# Patient Record
Sex: FEMALE | Race: BLACK OR AFRICAN AMERICAN | NOT HISPANIC OR LATINO | Employment: STUDENT | URBAN - METROPOLITAN AREA
[De-identification: names, ages, dates, MRNs, and addresses within clinical notes are randomized per-mention and may not be internally consistent; named-entity substitution may affect disease eponyms.]

---

## 2020-04-30 ENCOUNTER — HOSPITAL ENCOUNTER (EMERGENCY)
Facility: HOSPITAL | Age: 30
Discharge: HOME | End: 2020-04-30
Attending: EMERGENCY MEDICINE
Payer: COMMERCIAL

## 2020-04-30 VITALS
DIASTOLIC BLOOD PRESSURE: 72 MMHG | RESPIRATION RATE: 18 BRPM | WEIGHT: 270 LBS | BODY MASS INDEX: 40.92 KG/M2 | HEART RATE: 83 BPM | HEIGHT: 68 IN | OXYGEN SATURATION: 94 % | TEMPERATURE: 98 F | SYSTOLIC BLOOD PRESSURE: 133 MMHG

## 2020-04-30 DIAGNOSIS — N76.0 VULVOVAGINITIS: Primary | ICD-10-CM

## 2020-04-30 LAB
B-HCG UR QL: NEGATIVE
BACTERIA GENITAL QL WET PREP: ABNORMAL
BILIRUB UR QL STRIP: NEGATIVE
C TRACH DNA SPEC QL NAA+PROBE: NOT DETECTED
CLARITY UR: ABNORMAL
CLUE CELLS VAG QL WET PREP: ABNORMAL
COLOR UR: YELLOW
CTP QC/QA: YES
FILAMENT FUNGI VAG WET PREP-#/AREA: ABNORMAL
GLUCOSE UR QL STRIP: NEGATIVE
HGB UR QL STRIP: NEGATIVE
KETONES UR QL STRIP: NEGATIVE
LEUKOCYTE ESTERASE UR QL STRIP: ABNORMAL
MICROSCOPIC COMMENT: NORMAL
N GONORRHOEA DNA SPEC QL NAA+PROBE: NOT DETECTED
NITRITE UR QL STRIP: NEGATIVE
PH UR STRIP: 6 [PH] (ref 5–8)
PROT UR QL STRIP: NEGATIVE
SP GR UR STRIP: 1.01 (ref 1–1.03)
SPECIMEN SOURCE: ABNORMAL
T VAGINALIS GENITAL QL WET PREP: ABNORMAL
URN SPEC COLLECT METH UR: ABNORMAL
UROBILINOGEN UR STRIP-ACNC: NEGATIVE EU/DL
WBC #/AREA URNS HPF: 5 /HPF (ref 0–5)
WBC #/AREA VAG WET PREP: ABNORMAL
YEAST GENITAL QL WET PREP: ABNORMAL

## 2020-04-30 PROCEDURE — 99284 EMERGENCY DEPT VISIT MOD MDM: CPT

## 2020-04-30 PROCEDURE — 81025 URINE PREGNANCY TEST: CPT | Performed by: EMERGENCY MEDICINE

## 2020-04-30 PROCEDURE — 25000003 PHARM REV CODE 250: Performed by: NURSE PRACTITIONER

## 2020-04-30 PROCEDURE — 87491 CHLMYD TRACH DNA AMP PROBE: CPT

## 2020-04-30 PROCEDURE — 81000 URINALYSIS NONAUTO W/SCOPE: CPT

## 2020-04-30 PROCEDURE — 87210 SMEAR WET MOUNT SALINE/INK: CPT

## 2020-04-30 RX ORDER — FLUCONAZOLE 50 MG/1
150 TABLET ORAL
Status: COMPLETED | OUTPATIENT
Start: 2020-04-30 | End: 2020-04-30

## 2020-04-30 RX ORDER — ASPIRIN 325 MG
1 TABLET, DELAYED RELEASE (ENTERIC COATED) ORAL NIGHTLY
Qty: 45 G | Refills: 0 | Status: SHIPPED | OUTPATIENT
Start: 2020-04-30 | End: 2020-05-07

## 2020-04-30 RX ADMIN — FLUCONAZOLE 150 MG: 50 TABLET ORAL at 04:04

## 2020-04-30 NOTE — ED PROVIDER NOTES
Encounter Date: 4/30/2020       History     Chief Complaint   Patient presents with    Groin Swelling     reports vaginal swelling since Sun with white discharge; denies itchiness and dysuria. Reports trying Boric acid with mild relief     CC: Vaginal swelling    HPI:  This is a 29-year-old female with no medical problems presenting for evaluation of vaginal swelling that began Sunday.  She also reports associated white discharge that has improved.  She has been attempting treatment using boric acid suppository.  She denies fever, chills, abdominal pain, back pain, urinary complaints.  No known allergies.        Review of patient's allergies indicates:   Allergen Reactions    No known drug allergies      Past Medical History:   Diagnosis Date    Abnormal Pap smear     ASCUS + HR HPV    Anemia     Chlamydia     treated for chlamydia about 6 months ago    Sickle cell trait     Thrombocytopenia     in pregnancy     No past surgical history on file.  Family History   Problem Relation Age of Onset    Breast cancer Neg Hx     Ovarian cancer Neg Hx     Colon cancer Maternal Grandmother      Social History     Tobacco Use    Smoking status: Never Smoker   Substance Use Topics    Alcohol use: No    Drug use: No     Review of Systems   Constitutional: Negative for chills and fever.   HENT: Negative for sore throat.    Respiratory: Negative for shortness of breath.    Cardiovascular: Negative for chest pain.   Gastrointestinal: Negative for nausea.   Genitourinary: Positive for vaginal discharge. Negative for dysuria.        Vaginal swelling   Musculoskeletal: Negative for back pain.   Skin: Negative for rash.   Neurological: Negative for weakness.   Hematological: Does not bruise/bleed easily.       Physical Exam     Initial Vitals [04/30/20 0348]   BP Pulse Resp Temp SpO2   (!) 140/84 94 18 98.3 °F (36.8 °C) 95 %      MAP       --         Physical Exam    Constitutional: She appears well-developed and  well-nourished. She is not diaphoretic. No distress.   HENT:   Head: Normocephalic and atraumatic.   Neck: Normal range of motion.   Cardiovascular: Normal rate, regular rhythm, normal heart sounds and intact distal pulses.   Pulmonary/Chest: Breath sounds normal. No respiratory distress.   Abdominal: Soft. Bowel sounds are normal. There is no tenderness.   Genitourinary: Vagina normal and uterus normal. There is no rash, tenderness, lesion or injury on the right labia. There is no rash, tenderness, lesion or injury on the left labia. Cervix exhibits no motion tenderness, no discharge and no friability. Right adnexum displays no mass, no tenderness and no fullness. Left adnexum displays no mass, no tenderness and no fullness.   Genitourinary Comments: Vulva is erythematous and edematous.  No significant discharge.  No abscess.  No CMT or friability.  No adnexal fullness or tenderness with palpation.   exam chaperoned by RN.   Musculoskeletal: Normal range of motion.   Neurological: She is alert and oriented to person, place, and time.   Skin: Skin is warm and dry.   Psychiatric: She has a normal mood and affect. Her behavior is normal.         ED Course   Procedures  Labs Reviewed   URINALYSIS, REFLEX TO URINE CULTURE - Abnormal; Notable for the following components:       Result Value    Appearance, UA Cloudy (*)     Leukocytes, UA 2+ (*)     All other components within normal limits    Narrative:     Preferred Collection Type->Urine, Clean Catch   VAGINAL SCREEN - Abnormal; Notable for the following components:    WBC - Vaginal Screen Rare (*)     All other components within normal limits   C. TRACHOMATIS/N. GONORRHOEAE BY AMP DNA   URINALYSIS MICROSCOPIC    Narrative:     Preferred Collection Type->Urine, Clean Catch   POCT URINE PREGNANCY          Imaging Results    None          Medical Decision Making:   ED Management:  This is a 29-year-old female presenting for evaluation of vaginal swelling.  Likely  Candida vulvovaginitis.  No abscess appreciated.  No findings concerning for STI at this time.  No CMT or friability to suggest acute cervicitis.  No adnexal fullness or tenderness palpation to suggest PID, TOA.  Will treat with oral Diflucan and topical clotrimazole.  Follow up with OBGYN.                                 Clinical Impression:       ICD-10-CM ICD-9-CM   1. Vulvovaginitis N76.0 616.10         Disposition:   Disposition: Discharged  Condition: Stable     ED Disposition Condition    Discharge Stable        ED Prescriptions     Medication Sig Dispense Start Date End Date Auth. Provider    clotrimazole (LOTRIMIN) 1 % Crea Place 1 suppository (1 applicator total) vaginally nightly. for 7 days 45 g 4/30/2020 5/7/2020 Christie Ernst NP        Follow-up Information     Follow up With Specialties Details Why Contact Info    Curtis Schultz Mai, MD Obstetrics and Gynecology, Obstetrics, Gynecology Schedule an appointment as soon as possible for a visit  For follow-up [address]   [facility] [address]  [phone]      [facility] Emergency Medicine Go to  If symptoms worsen [address]  [facility] [id number] [phone]                                     Christie Ernst NP  04/30/20 0456

## 2020-04-30 NOTE — DISCHARGE INSTRUCTIONS

## 2020-04-30 NOTE — ED NOTES
Pt c/o swelling to the vaginal area x 3 days, denies itching, states she used boric acid and had some relief. Pt is A & O x 3, denies SOB, fever, chills, cough and N/V/D. Skin is warm, dry and pink. OPAL x 3mm. BBS- CTA. Abd- SNT. PSM x 4 exts. Will continue to monitor closely.

## 2020-06-24 ENCOUNTER — HOSPITAL ENCOUNTER (EMERGENCY)
Facility: HOSPITAL | Age: 30
Discharge: HOME | End: 2020-06-24
Attending: EMERGENCY MEDICINE
Payer: COMMERCIAL

## 2020-06-24 VITALS
TEMPERATURE: 99 F | BODY MASS INDEX: 40.92 KG/M2 | OXYGEN SATURATION: 95 % | HEART RATE: 85 BPM | RESPIRATION RATE: 18 BRPM | SYSTOLIC BLOOD PRESSURE: 142 MMHG | DIASTOLIC BLOOD PRESSURE: 90 MMHG | HEIGHT: 68 IN | WEIGHT: 270 LBS

## 2020-06-24 DIAGNOSIS — K04.7 DENTAL ABSCESS: Primary | ICD-10-CM

## 2020-06-24 DIAGNOSIS — R22.0 LEFT FACIAL SWELLING: ICD-10-CM

## 2020-06-24 LAB
B-HCG UR QL: NEGATIVE
CTP QC/QA: YES

## 2020-06-24 PROCEDURE — 81025 URINE PREGNANCY TEST: CPT | Performed by: PHYSICIAN ASSISTANT

## 2020-06-24 PROCEDURE — 99283 EMERGENCY DEPT VISIT LOW MDM: CPT

## 2020-06-24 PROCEDURE — 25000003 PHARM REV CODE 250: Performed by: PHYSICIAN ASSISTANT

## 2020-06-24 RX ORDER — HYDROCODONE BITARTRATE AND ACETAMINOPHEN 5; 325 MG/1; MG/1
1 TABLET ORAL EVERY 6 HOURS PRN
Qty: 12 TABLET | Refills: 0 | Status: SHIPPED | OUTPATIENT
Start: 2020-06-24 | End: 2020-06-27

## 2020-06-24 RX ORDER — CLINDAMYCIN HYDROCHLORIDE 150 MG/1
300 CAPSULE ORAL
Status: COMPLETED | OUTPATIENT
Start: 2020-06-24 | End: 2020-06-24

## 2020-06-24 RX ORDER — CLINDAMYCIN HYDROCHLORIDE 150 MG/1
300 CAPSULE ORAL 4 TIMES DAILY
Qty: 80 CAPSULE | Refills: 0 | Status: SHIPPED | OUTPATIENT
Start: 2020-06-24 | End: 2020-07-04

## 2020-06-24 RX ORDER — MELOXICAM 7.5 MG/1
7.5 TABLET ORAL DAILY
Qty: 12 TABLET | Refills: 0 | Status: SHIPPED | OUTPATIENT
Start: 2020-06-24

## 2020-06-24 RX ADMIN — CLINDAMYCIN HYDROCHLORIDE 300 MG: 150 CAPSULE ORAL at 02:06

## 2020-06-24 NOTE — ED PROVIDER NOTES
Encounter Date: 6/24/2020       History     Chief Complaint   Patient presents with    Facial Swelling     Patient reports left side facial swelling  and right upper tooth pain that started on Sunday. Seen yesterday at urgent care, given rx for amoxicillin and ibuprofen. No relief of symptoms.     29-year-old female with no pertinent past medical history presents to the emergency department for 4 day history of left upper dental pain that worsened yesterday.  Started on Amoxil yesterday at an urgent care.  Has appointment with dentist Saturday morning.  Patient has also been started on gabapentin and Motrin by urgent care which is not improving or symptoms.  Denies sore throat, fever, and emesis.  Tolerating p.o..  No history of similar symptoms.        Review of patient's allergies indicates:   Allergen Reactions    No known drug allergies      Past Medical History:   Diagnosis Date    Abnormal Pap smear     ASCUS + HR HPV    Anemia     Chlamydia     treated for chlamydia about 6 months ago    Sickle cell trait     Thrombocytopenia     in pregnancy     History reviewed. No pertinent surgical history.  Family History   Problem Relation Age of Onset    Colon cancer Maternal Grandmother     Breast cancer Neg Hx     Ovarian cancer Neg Hx      Social History     Tobacco Use    Smoking status: Never Smoker   Substance Use Topics    Alcohol use: No    Drug use: No     Review of Systems   Constitutional: Negative for fever.   HENT: Positive for dental problem and facial swelling. Negative for congestion, sore throat and trouble swallowing.    Respiratory: Negative for cough and shortness of breath.    Cardiovascular: Negative for chest pain.   Gastrointestinal: Negative for abdominal pain, constipation, diarrhea, nausea and vomiting.   Genitourinary: Negative for dysuria, flank pain, frequency and urgency.   Musculoskeletal: Negative for back pain.   Skin: Negative for rash.   Neurological: Negative for  headaches.   All other systems reviewed and are negative.      Physical Exam     Initial Vitals [06/24/20 1332]   BP Pulse Resp Temp SpO2   (!) 152/99 83 18 99.3 °F (37.4 °C) 98 %      MAP       --         Physical Exam    Nursing note and vitals reviewed.  Constitutional: She appears well-developed and well-nourished. She is not diaphoretic. No distress.   HENT:   Head: Normocephalic and atraumatic.   Nose: Nose normal.   Mild swelling to the left face without erythema.  Multiple dental caries noted.  No erythema or swelling of the gum line.  Speaking in clear and complete sentences with no changes in phonation.  No trismus or drooling.  No maxillary sinus tenderness.   Eyes: Conjunctivae and EOM are normal. Right eye exhibits no discharge. Left eye exhibits no discharge.   Neck: Normal range of motion. No tracheal deviation present. No JVD present.   Cardiovascular: Normal rate, regular rhythm and normal heart sounds. Exam reveals no friction rub.    No murmur heard.  Pulmonary/Chest: Breath sounds normal. No stridor. No respiratory distress. She has no wheezes. She has no rhonchi. She has no rales. She exhibits no tenderness.   Musculoskeletal: Normal range of motion.   Neurological: She is alert and oriented to person, place, and time.   Skin: Skin is warm and dry. No rash and no abscess noted. No erythema. No pallor.         ED Course   Procedures  Labs Reviewed   POCT URINE PREGNANCY          Imaging Results    None          Medical Decision Making:   History:   Old Medical Records: I decided to obtain old medical records.  Clinical Tests:   Lab Tests: Ordered and Reviewed  ED Management:  Patient appears to have a small/developing dental abscess.  No visible/drainable abscess in the ED today.  Will add clindamycin to patient's previously prescribed Amoxil.  Patient has dental appointment Saturday; advised that she get an earlier appointment if possible.  No Deshawn's or deep space infection.  I doubt  osteomyelitis of sinus cavity.  Sent home with supportive care.  Strict return precautions discussed with patient who is agreeable to the plan                                 Clinical Impression:       ICD-10-CM ICD-9-CM   1. Dental abscess  K04.7 522.5   2. Left facial swelling  R22.0 784.2             ED Disposition Condition    Discharge Stable        ED Prescriptions     Medication Sig Dispense Start Date End Date Auth. Provider    clindamycin (CLEOCIN) 150 MG capsule Take 2 capsules (300 mg total) by mouth 4 (four) times daily. for 10 days 80 capsule 6/24/2020 7/4/2020 Chano Wilkins PA-C    meloxicam (MOBIC) 7.5 MG tablet Take 1 tablet (7.5 mg total) by mouth once daily. 12 tablet 6/24/2020  Chano Wilkins PA-C    HYDROcodone-acetaminophen (NORCO) 5-325 mg per tablet Take 1 tablet by mouth every 6 (six) hours as needed for Pain. 12 tablet 6/24/2020 6/27/2020 Chano Wilkins PA-C        Follow-up Information     Follow up With Specialties Details Why Contact Info    Ghislaine Pizarro  Schedule an appointment as soon as possible for a visit in 1 day For further evaluation of dental issues Address: [address] [place] [address] B, [place] [address]  Phone: [phone]  Appointments: [email or web address]    [facility] Surgical Oncology, Orthopedic Surgery, Genetics, Physical Medicine and Rehabilitation, Occupational Therapy, Radiology Go in 1 day as an alternative to specialist evaluation [address] [facility] [address]  [phone]      [facility] Emergency Medicine Go to  If symptoms worsen [address]  [facility] [id number] [phone]                                     Chano Wilkins PA-C  06/24/20 1260

## 2020-07-25 PROCEDURE — 99285 EMERGENCY DEPT VISIT HI MDM: CPT | Mod: 25

## 2020-07-26 ENCOUNTER — HOSPITAL ENCOUNTER (INPATIENT)
Facility: HOSPITAL | Age: 30
LOS: 3 days | Discharge: HOME | DRG: 871 | End: 2020-07-29
Attending: EMERGENCY MEDICINE | Admitting: EMERGENCY MEDICINE
Payer: COMMERCIAL

## 2020-07-26 DIAGNOSIS — R04.2 COUGH WITH HEMOPTYSIS: ICD-10-CM

## 2020-07-26 DIAGNOSIS — R05.9 COUGH: ICD-10-CM

## 2020-07-26 DIAGNOSIS — J18.9 PNEUMONIA OF RIGHT LOWER LOBE DUE TO INFECTIOUS ORGANISM: Primary | ICD-10-CM

## 2020-07-26 DIAGNOSIS — R04.2 HEMOPTYSIS: ICD-10-CM

## 2020-07-26 DIAGNOSIS — R00.0 TACHYCARDIA: ICD-10-CM

## 2020-07-26 PROBLEM — R79.89 POSITIVE D DIMER: Status: ACTIVE | Noted: 2020-07-26

## 2020-07-26 PROBLEM — A41.9 SEPSIS: Status: ACTIVE | Noted: 2020-07-26

## 2020-07-26 PROBLEM — D69.6 THROMBOCYTOPENIA: Status: ACTIVE | Noted: 2020-07-26

## 2020-07-26 LAB
ALBUMIN SERPL BCP-MCNC: 3.7 G/DL (ref 3.5–5.2)
ALP SERPL-CCNC: 67 U/L (ref 55–135)
ALT SERPL W/O P-5'-P-CCNC: 6 U/L (ref 10–44)
ANION GAP SERPL CALC-SCNC: 7 MMOL/L (ref 8–16)
APTT BLDCRRT: 35.1 SEC (ref 21–32)
AST SERPL-CCNC: 10 U/L (ref 10–40)
B-HCG UR QL: NEGATIVE
BASOPHILS # BLD AUTO: 0.02 K/UL (ref 0–0.2)
BASOPHILS NFR BLD: 0.1 % (ref 0–1.9)
BILIRUB SERPL-MCNC: 0.9 MG/DL (ref 0.1–1)
BNP SERPL-MCNC: 79 PG/ML (ref 0–99)
BUN SERPL-MCNC: 9 MG/DL (ref 6–20)
CALCIUM SERPL-MCNC: 9.1 MG/DL (ref 8.7–10.5)
CHLORIDE SERPL-SCNC: 106 MMOL/L (ref 95–110)
CK SERPL-CCNC: 30 U/L (ref 20–180)
CO2 SERPL-SCNC: 25 MMOL/L (ref 23–29)
CREAT SERPL-MCNC: 0.8 MG/DL (ref 0.5–1.4)
CRP SERPL-MCNC: 261 MG/L (ref 0–8.2)
CTP QC/QA: YES
D DIMER PPP IA.FEU-MCNC: 1.36 MG/L FEU
DIFFERENTIAL METHOD: ABNORMAL
EOSINOPHIL # BLD AUTO: 0 K/UL (ref 0–0.5)
EOSINOPHIL NFR BLD: 0.1 % (ref 0–8)
ERYTHROCYTE [DISTWIDTH] IN BLOOD BY AUTOMATED COUNT: 14.1 % (ref 11.5–14.5)
ERYTHROCYTE [SEDIMENTATION RATE] IN BLOOD BY WESTERGREN METHOD: 50 MM/HR (ref 0–20)
EST. GFR  (AFRICAN AMERICAN): >60 ML/MIN/1.73 M^2
EST. GFR  (NON AFRICAN AMERICAN): >60 ML/MIN/1.73 M^2
FERRITIN SERPL-MCNC: 178 NG/ML (ref 20–300)
FIBRINOGEN PPP-MCNC: 517 MG/DL (ref 182–366)
GLUCOSE SERPL-MCNC: 99 MG/DL (ref 70–110)
HCT VFR BLD AUTO: 39.1 % (ref 37–48.5)
HGB BLD-MCNC: 13.3 G/DL (ref 12–16)
IMM GRANULOCYTES # BLD AUTO: 0.11 K/UL (ref 0–0.04)
IMM GRANULOCYTES NFR BLD AUTO: 0.7 % (ref 0–0.5)
INR PPP: 1 (ref 0.8–1.2)
LACTATE SERPL-SCNC: 2.1 MMOL/L (ref 0.5–2.2)
LDH SERPL L TO P-CCNC: 222 U/L (ref 110–260)
LYMPHOCYTES # BLD AUTO: 1 K/UL (ref 1–4.8)
LYMPHOCYTES NFR BLD: 6.6 % (ref 18–48)
MCH RBC QN AUTO: 28.6 PG (ref 27–31)
MCHC RBC AUTO-ENTMCNC: 34 G/DL (ref 32–36)
MCV RBC AUTO: 84 FL (ref 82–98)
MONOCYTES # BLD AUTO: 0.4 K/UL (ref 0.3–1)
MONOCYTES NFR BLD: 2.3 % (ref 4–15)
NEUTROPHILS # BLD AUTO: 14 K/UL (ref 1.8–7.7)
NEUTROPHILS NFR BLD: 90.2 % (ref 38–73)
NRBC BLD-RTO: 0 /100 WBC
PLATELET # BLD AUTO: 129 K/UL (ref 150–350)
PMV BLD AUTO: 11.4 FL (ref 9.2–12.9)
POTASSIUM SERPL-SCNC: 3.2 MMOL/L (ref 3.5–5.1)
PROCALCITONIN SERPL IA-MCNC: 6.07 NG/ML
PROT SERPL-MCNC: 7 G/DL (ref 6–8.4)
PROTHROMBIN TIME: 11.3 SEC (ref 9–12.5)
RBC # BLD AUTO: 4.65 M/UL (ref 4–5.4)
SARS-COV-2 RDRP RESP QL NAA+PROBE: NEGATIVE
SODIUM SERPL-SCNC: 138 MMOL/L (ref 136–145)
TROPONIN I SERPL DL<=0.01 NG/ML-MCNC: 0.01 NG/ML (ref 0–0.03)
WBC # BLD AUTO: 15.53 K/UL (ref 3.9–12.7)

## 2020-07-26 PROCEDURE — 87186 SC STD MICRODIL/AGAR DIL: CPT | Mod: 59

## 2020-07-26 PROCEDURE — 36415 COLL VENOUS BLD VENIPUNCTURE: CPT

## 2020-07-26 PROCEDURE — 82550 ASSAY OF CK (CPK): CPT

## 2020-07-26 PROCEDURE — 83880 ASSAY OF NATRIURETIC PEPTIDE: CPT

## 2020-07-26 PROCEDURE — 84484 ASSAY OF TROPONIN QUANT: CPT

## 2020-07-26 PROCEDURE — 11000001 HC ACUTE MED/SURG PRIVATE ROOM

## 2020-07-26 PROCEDURE — 93005 ELECTROCARDIOGRAM TRACING: CPT

## 2020-07-26 PROCEDURE — 93010 EKG 12-LEAD: ICD-10-PCS | Mod: ,,, | Performed by: INTERNAL MEDICINE

## 2020-07-26 PROCEDURE — 84145 PROCALCITONIN (PCT): CPT

## 2020-07-26 PROCEDURE — 87070 CULTURE OTHR SPECIMN AEROBIC: CPT

## 2020-07-26 PROCEDURE — 27100098 HC SPACER

## 2020-07-26 PROCEDURE — 85610 PROTHROMBIN TIME: CPT

## 2020-07-26 PROCEDURE — U0003 INFECTIOUS AGENT DETECTION BY NUCLEIC ACID (DNA OR RNA); SEVERE ACUTE RESPIRATORY SYNDROME CORONAVIRUS 2 (SARS-COV-2) (CORONAVIRUS DISEASE [COVID-19]), AMPLIFIED PROBE TECHNIQUE, MAKING USE OF HIGH THROUGHPUT TECHNOLOGIES AS DESCRIBED BY CMS-2020-01-R: HCPCS

## 2020-07-26 PROCEDURE — 85384 FIBRINOGEN ACTIVITY: CPT

## 2020-07-26 PROCEDURE — 86140 C-REACTIVE PROTEIN: CPT

## 2020-07-26 PROCEDURE — 63600175 PHARM REV CODE 636 W HCPCS: Performed by: EMERGENCY MEDICINE

## 2020-07-26 PROCEDURE — U0002 COVID-19 LAB TEST NON-CDC: HCPCS

## 2020-07-26 PROCEDURE — 25000003 PHARM REV CODE 250: Performed by: NURSE PRACTITIONER

## 2020-07-26 PROCEDURE — 87040 BLOOD CULTURE FOR BACTERIA: CPT

## 2020-07-26 PROCEDURE — 25000003 PHARM REV CODE 250: Performed by: EMERGENCY MEDICINE

## 2020-07-26 PROCEDURE — 93010 ELECTROCARDIOGRAM REPORT: CPT | Mod: ,,, | Performed by: INTERNAL MEDICINE

## 2020-07-26 PROCEDURE — 87205 SMEAR GRAM STAIN: CPT

## 2020-07-26 PROCEDURE — 25500020 PHARM REV CODE 255: Performed by: HOSPITALIST

## 2020-07-26 PROCEDURE — 87185 SC STD ENZYME DETCJ PER NZM: CPT

## 2020-07-26 PROCEDURE — 63600175 PHARM REV CODE 636 W HCPCS: Performed by: HOSPITALIST

## 2020-07-26 PROCEDURE — 87077 CULTURE AEROBIC IDENTIFY: CPT

## 2020-07-26 PROCEDURE — 63700000 PHARM REV CODE 250 ALT 637 W/O HCPCS: Performed by: EMERGENCY MEDICINE

## 2020-07-26 PROCEDURE — 25000003 PHARM REV CODE 250: Performed by: HOSPITALIST

## 2020-07-26 PROCEDURE — 81025 URINE PREGNANCY TEST: CPT | Performed by: EMERGENCY MEDICINE

## 2020-07-26 PROCEDURE — 83605 ASSAY OF LACTIC ACID: CPT

## 2020-07-26 PROCEDURE — 25000242 PHARM REV CODE 250 ALT 637 W/ HCPCS: Performed by: EMERGENCY MEDICINE

## 2020-07-26 PROCEDURE — 94640 AIRWAY INHALATION TREATMENT: CPT

## 2020-07-26 PROCEDURE — 80053 COMPREHEN METABOLIC PANEL: CPT

## 2020-07-26 PROCEDURE — 94761 N-INVAS EAR/PLS OXIMETRY MLT: CPT

## 2020-07-26 PROCEDURE — 82728 ASSAY OF FERRITIN: CPT

## 2020-07-26 PROCEDURE — 85025 COMPLETE CBC W/AUTO DIFF WBC: CPT

## 2020-07-26 PROCEDURE — 85730 THROMBOPLASTIN TIME PARTIAL: CPT

## 2020-07-26 PROCEDURE — 83615 LACTATE (LD) (LDH) ENZYME: CPT

## 2020-07-26 PROCEDURE — 85379 FIBRIN DEGRADATION QUANT: CPT

## 2020-07-26 PROCEDURE — 85652 RBC SED RATE AUTOMATED: CPT

## 2020-07-26 RX ORDER — LOPERAMIDE HYDROCHLORIDE 2 MG/1
2 CAPSULE ORAL 4 TIMES DAILY PRN
Status: DISCONTINUED | OUTPATIENT
Start: 2020-07-26 | End: 2020-07-29 | Stop reason: HOSPADM

## 2020-07-26 RX ORDER — AZITHROMYCIN 250 MG/1
250 TABLET, FILM COATED ORAL DAILY
Status: DISCONTINUED | OUTPATIENT
Start: 2020-07-26 | End: 2020-07-28

## 2020-07-26 RX ORDER — POTASSIUM CHLORIDE 20 MEQ/1
40 TABLET, EXTENDED RELEASE ORAL ONCE
Status: COMPLETED | OUTPATIENT
Start: 2020-07-26 | End: 2020-07-26

## 2020-07-26 RX ORDER — BENZONATATE 100 MG/1
100 CAPSULE ORAL 3 TIMES DAILY PRN
Status: DISCONTINUED | OUTPATIENT
Start: 2020-07-26 | End: 2020-07-29 | Stop reason: HOSPADM

## 2020-07-26 RX ORDER — ALBUTEROL SULFATE 90 UG/1
2 AEROSOL, METERED RESPIRATORY (INHALATION)
Status: COMPLETED | OUTPATIENT
Start: 2020-07-26 | End: 2020-07-26

## 2020-07-26 RX ORDER — ONDANSETRON 2 MG/ML
4 INJECTION INTRAMUSCULAR; INTRAVENOUS EVERY 8 HOURS PRN
Status: DISCONTINUED | OUTPATIENT
Start: 2020-07-26 | End: 2020-07-29 | Stop reason: HOSPADM

## 2020-07-26 RX ORDER — SODIUM CHLORIDE 9 MG/ML
INJECTION, SOLUTION INTRAVENOUS CONTINUOUS
Status: DISCONTINUED | OUTPATIENT
Start: 2020-07-26 | End: 2020-07-29 | Stop reason: HOSPADM

## 2020-07-26 RX ORDER — ENOXAPARIN SODIUM 100 MG/ML
40 INJECTION SUBCUTANEOUS EVERY 24 HOURS
Status: DISCONTINUED | OUTPATIENT
Start: 2020-07-26 | End: 2020-07-29 | Stop reason: HOSPADM

## 2020-07-26 RX ORDER — IBUPROFEN 200 MG
16 TABLET ORAL
Status: DISCONTINUED | OUTPATIENT
Start: 2020-07-26 | End: 2020-07-29 | Stop reason: HOSPADM

## 2020-07-26 RX ORDER — BENZONATATE 100 MG/1
CAPSULE ORAL
Status: DISPENSED
Start: 2020-07-26 | End: 2020-07-26

## 2020-07-26 RX ORDER — SODIUM CHLORIDE 0.9 % (FLUSH) 0.9 %
10 SYRINGE (ML) INJECTION
Status: DISCONTINUED | OUTPATIENT
Start: 2020-07-26 | End: 2020-07-29 | Stop reason: HOSPADM

## 2020-07-26 RX ORDER — IBUPROFEN 200 MG
24 TABLET ORAL
Status: DISCONTINUED | OUTPATIENT
Start: 2020-07-26 | End: 2020-07-29 | Stop reason: HOSPADM

## 2020-07-26 RX ORDER — GLUCAGON 1 MG
1 KIT INJECTION
Status: DISCONTINUED | OUTPATIENT
Start: 2020-07-26 | End: 2020-07-29 | Stop reason: HOSPADM

## 2020-07-26 RX ORDER — ACETAMINOPHEN 325 MG/1
650 TABLET ORAL EVERY 6 HOURS PRN
Status: DISCONTINUED | OUTPATIENT
Start: 2020-07-26 | End: 2020-07-29 | Stop reason: HOSPADM

## 2020-07-26 RX ADMIN — POTASSIUM CHLORIDE 40 MEQ: 1500 TABLET, EXTENDED RELEASE ORAL at 08:07

## 2020-07-26 RX ADMIN — ACETAMINOPHEN 650 MG: 325 TABLET ORAL at 03:07

## 2020-07-26 RX ADMIN — LOPERAMIDE HYDROCHLORIDE 2 MG: 2 CAPSULE ORAL at 12:07

## 2020-07-26 RX ADMIN — ENOXAPARIN SODIUM 40 MG: 40 INJECTION SUBCUTANEOUS at 05:07

## 2020-07-26 RX ADMIN — BENZONATATE 100 MG: 100 CAPSULE ORAL at 12:07

## 2020-07-26 RX ADMIN — AZITHROMYCIN MONOHYDRATE 250 MG: 250 TABLET ORAL at 08:07

## 2020-07-26 RX ADMIN — SODIUM CHLORIDE: 0.9 INJECTION, SOLUTION INTRAVENOUS at 08:07

## 2020-07-26 RX ADMIN — CEFTRIAXONE 1 G: 1 INJECTION, SOLUTION INTRAVENOUS at 04:07

## 2020-07-26 RX ADMIN — SODIUM CHLORIDE 1000 ML: 0.9 INJECTION, SOLUTION INTRAVENOUS at 04:07

## 2020-07-26 RX ADMIN — ACETAMINOPHEN 650 MG: 325 TABLET ORAL at 08:07

## 2020-07-26 RX ADMIN — ALBUTEROL SULFATE 2 PUFF: 90 AEROSOL, METERED RESPIRATORY (INHALATION) at 01:07

## 2020-07-26 RX ADMIN — BENZONATATE 100 MG: 100 CAPSULE ORAL at 01:07

## 2020-07-26 RX ADMIN — IOHEXOL 75 ML: 350 INJECTION, SOLUTION INTRAVENOUS at 04:07

## 2020-07-26 NOTE — PLAN OF CARE
SW met with patient to complete discharge needs assessment. Patient was able to verbalize her help at home is mother, Zayda Schwab. SW discussed with patient the things she's responsible for to manage her health at home would be by going on her doctor appointments, taking medications as prescribed, and getting prescriptions filled. SW wrote name and phone number on white communication board. Patient prefers to attend MD appointments around 4:00 PM.    Extended Emergency Contact Information  Primary Emergency Contact: Zayda Ty  Address: [address] . . .           [place] [address] [facility]  Home Phone: [phone]  Mobile Phone: [phone]  Relation: Mother     [facility] - [place] - [address]  [address]  [place] [address]  Phone: [phone] Fax: [phone]    [facility] [address] [place] [address]  Phone: [phone] Fax: [phone]        07/26/20 1418   Discharge Assessment   Confirmed/corrected address and phone number on facesheet? Yes   Assessment information obtained from? Patient   Communicated expected length of stay with patient/caregiver no   Prior to hospitilization cognitive status: Alert/Oriented   Prior to hospitalization functional status: Independent   Current cognitive status: Alert/Oriented   Current Functional Status: Independent   Facility Arrived From: Pt drove herself to the hospital   Lives With child(emeka), dependent   Able to Return to Prior Arrangements yes   Is patient able to care for self after discharge? Yes   Who are your caregiver(s) and their phone number(s)? Zayda Ty, mother [phone]   Patient's perception of discharge disposition home or selfcare   Readmission Within the Last 30 Days no previous admission in last 30 days   Patient currently being followed by outpatient case management? No   Patient currently receives any other outside agency services? No   Equipment  Currently Used at Home none   Do you have any problems affording any of your prescribed medications?   (Pt not currently prescribed any meds.)   Is the patient taking medications as prescribed?   (Pt not currently prescribed any meds.)   Does the patient have transportation home? Yes   Transportation Anticipated family or friend will provide   Dialysis Name and Scheduled days Not Applicable.   Does the patient receive services at the Coumadin Clinic? No   Discharge Plan A Home with family   DME Needed Upon Discharge  none   Patient/Family in Agreement with Plan yes

## 2020-07-26 NOTE — SUBJECTIVE & OBJECTIVE
Past Medical History:   Diagnosis Date    Abnormal Pap smear     ASCUS + HR HPV    Anemia     Chlamydia     treated for chlamydia about 6 months ago    Sickle cell trait     Thrombocytopenia     in pregnancy       History reviewed. No pertinent surgical history.    Review of patient's allergies indicates:   Allergen Reactions    No known drug allergies        No current facility-administered medications on file prior to encounter.      Current Outpatient Medications on File Prior to Encounter   Medication Sig    meloxicam (MOBIC) 7.5 MG tablet Take 1 tablet (7.5 mg total) by mouth once daily.    prenatal vit-iron fumarate-FA 27-1 mg Tab Take 1 tablet by mouth once daily.    [DISCONTINUED] medroxyPROGESTERone (DEPO-PROVERA) 150 mg/mL Syrg Inject 1 mL (150 mg total) into the muscle every 3 (three) months.     Family History     Problem Relation (Age of Onset)    Colon cancer Maternal Grandmother        Tobacco Use    Smoking status: Never Smoker   Substance and Sexual Activity    Alcohol use: No    Drug use: No    Sexual activity: Yes     Partners: Male     Birth control/protection: None     Review of Systems   Constitutional: Positive for fever.   HENT: Positive for congestion. Negative for sore throat.    Eyes: Negative for visual disturbance.   Respiratory: Positive for cough (blood tinged sputum), chest tightness and shortness of breath. Negative for wheezing.    Cardiovascular: Negative for chest pain, palpitations and leg swelling.   Gastrointestinal: Negative for abdominal pain, blood in stool, diarrhea, nausea and vomiting.   Genitourinary: Negative for dysuria, flank pain and hematuria.   Musculoskeletal: Negative for joint swelling and myalgias.   Skin: Negative for rash.   Neurological: Negative for headaches.   Psychiatric/Behavioral: Negative for confusion.     Objective:     Vital Signs (Most Recent):  Temp: 99.4 °F (37.4 °C) (07/26/20 0615)  Pulse: (!) 116 (07/26/20 0615)  Resp: 20  (07/26/20 0615)  BP: 130/70 (07/26/20 0615)  SpO2: 98 % (07/26/20 0615) Vital Signs (24h Range):  Temp:  [99 °F (37.2 °C)-100 °F (37.8 °C)] 99.4 °F (37.4 °C)  Pulse:  [113-133] 116  Resp:  [20-33] 20  SpO2:  [89 %-98 %] 98 %  BP: (121-156)/() 130/70     Weight: 121.6 kg (268 lb 1.3 oz)  Body mass index is 39.59 kg/m².    Physical Exam  Constitutional:       Appearance: She is ill-appearing. She is not diaphoretic.   HENT:      Head: Normocephalic and atraumatic.      Nose: Congestion present.   Eyes:      Pupils: Pupils are equal, round, and reactive to light.   Neck:      Musculoskeletal: Normal range of motion and neck supple.   Cardiovascular:      Rate and Rhythm: Tachycardia present.      Pulses: Normal pulses.      Heart sounds: Normal heart sounds.   Pulmonary:      Breath sounds: Rales (coarse R>L) present.      Comments: Tachypnea  Chest:      Chest wall: Tenderness (right sided) present.   Abdominal:      General: Bowel sounds are normal.      Palpations: Abdomen is soft.      Tenderness: There is no abdominal tenderness. There is no right CVA tenderness, left CVA tenderness or guarding.   Musculoskeletal: Normal range of motion.      Right lower leg: No edema.      Left lower leg: No edema.   Skin:     General: Skin is warm and dry.      Capillary Refill: Capillary refill takes less than 2 seconds.   Neurological:      Mental Status: She is alert and oriented to person, place, and time.   Psychiatric:         Mood and Affect: Mood normal.         Thought Content: Thought content normal.           CRANIAL NERVES     CN III, IV, VI   Pupils are equal, round, and reactive to light.       Significant Labs:   CBC:   Recent Labs   Lab 07/26/20  0128   WBC 15.53*   HGB 13.3   HCT 39.1   *     CMP:   Recent Labs   Lab 07/26/20 0128      K 3.2*      CO2 25   GLU 99   BUN 9   CREATININE 0.8   CALCIUM 9.1   PROT 7.0   ALBUMIN 3.7   BILITOT 0.9   ALKPHOS 67   AST 10   ALT 6*   ANIONGAP 7*    EGFRNONAA >60     Cardiac Markers: No results for input(s): CKMB, MYOGLOBIN, BNP, TROPISTAT in the last 48 hours.  Coagulation:   Recent Labs   Lab 07/26/20  0128   INR 1.0   APTT 35.1*     Lactic Acid:   Recent Labs   Lab 07/26/20  0408   LACTATE 2.1     POCT Glucose: No results for input(s): POCTGLUCOSE in the last 48 hours.  Respiratory Culture: No results for input(s): GSRESP, RESPIRATORYC in the last 48 hours.  Troponin:   Recent Labs   Lab 07/26/20  0128   TROPONINI 0.007     Urine Studies: No results for input(s): COLORU, APPEARANCEUA, PHUR, SPECGRAV, PROTEINUA, GLUCUA, KETONESU, BILIRUBINUA, OCCULTUA, NITRITE, UROBILINOGEN, LEUKOCYTESUR, RBCUA, WBCUA, BACTERIA, SQUAMEPITHEL, HYALINECASTS in the last 48 hours.    Invalid input(s): WRIGHTSUR  All pertinent labs within the past 24 hours have been reviewed.    Significant Imaging: I have reviewed and interpreted all pertinent imaging results/findings within the past 24 hours.

## 2020-07-26 NOTE — ED PROVIDER NOTES
Encounter Date: 7/25/2020       History     Chief Complaint   Patient presents with    Hemoptysis     dx'd w/ PNA today, coughing alot, had fever of 103 today     29-year-old female who denies past medical history presents with hemoptysis.  She states she woke up this morning feeling unwell, she had a fever.  She had a cough.  She went to an urgent care where she was diagnosed with pneumonia, she was prescribed azithromycin.  She states after returning home she took her first dose of azithromycin and started having cough with blood in the sputum.  This prompted her to present to the emergency department.  She states she did have right-sided chest pain earlier today but after taking Tylenol and Motrin this improved.  She last took Tylenol several hours ago.  She denies previous history of hemoptysis, denies previous history of incarceration or residing in a homeless shelter or previous diagnosis of tuberculosis.  Patient also reports she was tested for COVID earlier today and states the result was negative, however, she has been getting emails from work about multiple people who have tested positive recently.        Review of patient's allergies indicates:   Allergen Reactions    No known drug allergies      Past Medical History:   Diagnosis Date    Abnormal Pap smear     ASCUS + HR HPV    Anemia     Chlamydia     treated for chlamydia about 6 months ago    Sickle cell trait     Thrombocytopenia     in pregnancy     History reviewed. No pertinent surgical history.  Family History   Problem Relation Age of Onset    Colon cancer Maternal Grandmother     Breast cancer Neg Hx     Ovarian cancer Neg Hx      Social History     Tobacco Use    Smoking status: Never Smoker   Substance Use Topics    Alcohol use: No    Drug use: No     Review of Systems   Constitutional: Positive for chills and fever.   HENT: Negative for congestion and sore throat.    Eyes: Negative for visual disturbance.   Respiratory: Positive  for cough. Negative for shortness of breath.    Cardiovascular: Positive for chest pain.   Gastrointestinal: Negative for abdominal pain, nausea and vomiting.   Genitourinary: Negative for dysuria and vaginal discharge.   Skin: Negative for rash.   Neurological: Negative for headaches.   Psychiatric/Behavioral: Negative for decreased concentration.       Physical Exam     Initial Vitals [07/26/20 0003]   BP Pulse Resp Temp SpO2   (!) 142/67 (!) 120 20 99 °F (37.2 °C) (!) 93 %      MAP       --         Physical Exam    Nursing note and vitals reviewed.  Constitutional: She appears well-developed and well-nourished. She is not diaphoretic. No distress.   HENT:   Mouth/Throat: Oropharynx is clear and moist.   Eyes: Pupils are equal, round, and reactive to light.   Neck: Neck supple.   Cardiovascular: Regular rhythm. Tachycardia present.    Pulmonary/Chest: No respiratory distress.   Diminished breath sounds in the right base.  No respiratory distress.  Patient has an emesis bag with pink colored sputum.   Abdominal: Soft. There is no abdominal tenderness.   Musculoskeletal: No edema.   Neurological: She is alert and oriented to person, place, and time.   Skin: Skin is warm and dry.   Psychiatric: She has a normal mood and affect.         ED Course   Procedures  Labs Reviewed   CBC W/ AUTO DIFFERENTIAL - Abnormal; Notable for the following components:       Result Value    WBC 15.53 (*)     Platelets 129 (*)     Immature Granulocytes 0.7 (*)     Gran # (ANC) 14.0 (*)     Immature Grans (Abs) 0.11 (*)     Gran% 90.2 (*)     Lymph% 6.6 (*)     Mono% 2.3 (*)     All other components within normal limits   COMPREHENSIVE METABOLIC PANEL - Abnormal; Notable for the following components:    Potassium 3.2 (*)     ALT 6 (*)     Anion Gap 7 (*)     All other components within normal limits   APTT - Abnormal; Notable for the following components:    aPTT 35.1 (*)     All other components within normal limits   D DIMER,  QUANTITATIVE - Abnormal; Notable for the following components:    D-Dimer 1.36 (*)     All other components within normal limits   CULTURE, BLOOD   CULTURE, BLOOD   TROPONIN I   PROTIME-INR   SARS-COV-2 RNA AMPLIFICATION, QUAL   LACTIC ACID, PLASMA   POCT URINE PREGNANCY     EKG Readings: (Independently Interpreted)   Sinus tachycardia, rate 113 beats per minute, normal AK interval,  milliseconds.  No STEMI.       Imaging Results          X-Ray Chest AP Portable (Final result)  Result time 07/26/20 01:44:54    Final result by Triny Jensen MD (07/26/20 01:44:54)                 Impression:      Right lower lobe pneumonia.      Electronically signed by: Triny Jensen  Date:    07/26/2020  Time:    01:44             Narrative:    EXAMINATION:  AP PORTABLE CHEST    CLINICAL HISTORY:  hemoptysis;    TECHNIQUE:  AP portable chest radiograph was submitted.    COMPARISON:  08/26/2012    FINDINGS:  AP portable chest radiograph demonstrates a cardiac silhouette within normal limits.  There is a right lower lobe opacity.  There is no pneumothorax or significant pleural effusion.                                 Medical Decision Making:   Initial Assessment:   29-year-old female presenting with hemoptysis.  Diagnosed with pneumonia earlier today.  On exam, she does have slightly diminished breath sounds in the right base.  Her O2 sats 93% on room air.  She is tachycardic.  She has an emesis bag with pink tinged sputum.  My overall impression is patient is pneumonia, however, PE is a consideration.  I plan to assess this patient with basic labs, chest x-ray, coags, D-dimer.                   ED Course as of Jul 26 0437   Sun Jul 26, 2020   0316 Discussed with lab- there is a problem with the instrument running the D-dimer so this result has been delayed.     [LH]   0351 Patient's D-dimer is elevated.  I have ordered a CTA of the chest.  Given her hypoxia, pneumonia, I plan to admit this patient to Hospital  Medicine.  I gave her dose of Rocephin here, shared he took 500 mg of azithromycin prior to coming to the emergency department.  Will obtain blood cultures and lactic acid and give IV fluids.  Her COVID screen is negative.  Case was discussed with Blaze Post for admission to hospital medicine. CTA pending at time of admission.     [LH]      ED Course User Index  [LH] Dipti Henriquez MD                Clinical Impression:       ICD-10-CM ICD-9-CM   1. Pneumonia of right lower lobe due to infectious organism  J18.1 486   2. Hemoptysis  R04.2 786.30   3. Cough  R05 786.2   4. Cough with hemoptysis  R04.2 786.39   5. Tachycardia  R00.0 785.0             ED Disposition Condition    Admit                This dictation has been generated using M-Modal Fluency Direct dictation; some phonetic errors may occur.              Dipti Henriquez MD  07/26/20 0437

## 2020-07-26 NOTE — NURSING
Report received from Antonette PIEDRA. [Patient on telebox #[id number].]Patient remains free from falls and injury. No distress noted. VSS. Questions encouraged and answered. Patient verbalized understanding. Bed locked and in low position; safety maintained. Call light in reach. White board updated, and explained. No complaint of pain, n/v, diarrhea, or SOB.  Will continue to monitor, will continue with plan of care.

## 2020-07-26 NOTE — ASSESSMENT & PLAN NOTE
Meet sepsis criteria with fever, tachycardia, WBC, and source of infection as pneumonia. Covid negative. IVF bolus 1L given in ED.  BP stable. Lactate normal    - Continue IVF  - On Rocephin and Azithromycin  - BC and sputum gram stain and culture pending

## 2020-07-26 NOTE — ED TRIAGE NOTES
Pt present to the ED reports when to urgent care malcolm rayo'd w/ PNA today, coughing a lot with blood around 1400, had fever of 103 today with relief from medication. Pt also have some left side chest pain. Denies SOB.  Pt reports taking tylenol and ibuprofen last dose around 2100.

## 2020-07-26 NOTE — ASSESSMENT & PLAN NOTE
Has fever 102, hemoptysis, hypoxic. Chest xray showed right lower lobe pneumonia. Leukocytosis WBC 15. Oxygen sats drop to 89% on room air. Right-sided chest pain worse with cough and deep breathing. Per patient, COVID negative in an urgent care, and again negative in ED. Was diagnosed with pneumonia and given Azithromycin. Oxygen sats now 98% on 3L NC    - Add Rocephin and continue Azithromycin  - Check Procalcitonin, LD, Ferritin, CRP, Fibrinogen.  - Consider COVID routine screening pending labs  - Continuous pulse ox  - Oxygen supplemental as needed  - BC and sputum culture pending

## 2020-07-26 NOTE — NURSING
Call received from lab. Patient's blood cultures have grown 1/4 bottles, gram positive cocci and chains resembling strep. MD notified via secure chat. MD confirmed receipt of notice. Will continue to monitor. Will continue with plan of care.

## 2020-07-26 NOTE — NURSING
Patient transferred to COVID unit for Rule Out COVID per MD order. Routine COVID screen completed. Respiratory cultures collected. Patient appears tearful and verbalizes fear and uncertainty. Updated on plan of care to the best of this RNs knowledge. Reassurance provided. Education on disease processes provided. Patient verbalized understanding. Will continue to monitor. Will continue with plan of care.

## 2020-07-26 NOTE — ASSESSMENT & PLAN NOTE
Chronic with baseline from 119-139. Plt today 129. Elevated aPTT 35.1.     - Check fibrinogen level.

## 2020-07-26 NOTE — H&P
[facility] Hospital Medicine  History & Physical    Patient Name: Lubna Ty  MRN: [id number]  Admission Date: 7/26/2020  Attending Physician: Nicole Mojica MD   Primary Care Provider: Primary Doctor No         Patient information was obtained from patient and ER records.     Subjective:     Principal Problem:Sepsis    Chief Complaint:   Chief Complaint   Patient presents with    Hemoptysis     dx'd w/ PNA today, coughing alot, had fever of 103 today        HPI: This is 29 year old with sickle cell trait and thrombocytopenia who presents to the hospital with recent diagnosed with pneumonia and hemoptysis. Patient reports she has been having bad allergy symptoms since last week with bad congestion and productive cough. She states she woke up yesterday morning feeling unwell and had a fever of 102. She went to an urgent care where she was diagnosed with pneumonia, she was prescribed azithromycin.  She states after returning home she took her first dose of azithromycin and started having cough with blood in the sputum. She states she also has right-sided sharp chest pain with radiating to her upper right back, which worse with coughing and deep breathing. Chest pain improved with Tylenol and Motrin at home. She denies any abdominal pain, headache, loss of taste, N/V/D, dysuria. She denies previous history of hemoptysis, denies previous history of incarceration or residing in a homeless shelter or previous diagnosis of tuberculosis.  Patient also reports she was tested for COVID earlier today and states the result was negative, however, she has been getting emails from work about multiple people who have tested positive recently. She has Mirena IUD.    In ED, COVID negative. Chest xray with right lower lobe pneumonia.  Leukocytosis 15. Elevated D-Dimer. Elevated aPTT. Lactate negative. CTA chest without no PE.    Patient is admitted for sepsis/pneumonia        Past Medical History:   Diagnosis  Date    Abnormal Pap smear     ASCUS + HR HPV    Anemia     Chlamydia     treated for chlamydia about 6 months ago    Sickle cell trait     Thrombocytopenia     in pregnancy       History reviewed. No pertinent surgical history.    Review of patient's allergies indicates:   Allergen Reactions    No known drug allergies        No current facility-administered medications on file prior to encounter.      Current Outpatient Medications on File Prior to Encounter   Medication Sig    meloxicam (MOBIC) 7.5 MG tablet Take 1 tablet (7.5 mg total) by mouth once daily.    prenatal vit-iron fumarate-FA 27-1 mg Tab Take 1 tablet by mouth once daily.    [DISCONTINUED] medroxyPROGESTERone (DEPO-PROVERA) 150 mg/mL Syrg Inject 1 mL (150 mg total) into the muscle every 3 (three) months.     Family History     Problem Relation (Age of Onset)    Colon cancer Maternal Grandmother        Tobacco Use    Smoking status: Never Smoker   Substance and Sexual Activity    Alcohol use: No    Drug use: No    Sexual activity: Yes     Partners: Male     Birth control/protection: None     Review of Systems   Constitutional: Positive for fever.   HENT: Positive for congestion. Negative for sore throat.    Eyes: Negative for visual disturbance.   Respiratory: Positive for cough (blood tinged sputum), chest tightness and shortness of breath. Negative for wheezing.    Cardiovascular: Negative for chest pain, palpitations and leg swelling.   Gastrointestinal: Negative for abdominal pain, blood in stool, diarrhea, nausea and vomiting.   Genitourinary: Negative for dysuria, flank pain and hematuria.   Musculoskeletal: Negative for joint swelling and myalgias.   Skin: Negative for rash.   Neurological: Negative for headaches.   Psychiatric/Behavioral: Negative for confusion.     Objective:     Vital Signs (Most Recent):  Temp: 99.4 °F (37.4 °C) (07/26/20 0615)  Pulse: (!) 116 (07/26/20 0615)  Resp: 20 (07/26/20 0615)  BP: 130/70 (07/26/20  0615)  SpO2: 98 % (07/26/20 0615) Vital Signs (24h Range):  Temp:  [99 °F (37.2 °C)-100 °F (37.8 °C)] 99.4 °F (37.4 °C)  Pulse:  [113-133] 116  Resp:  [20-33] 20  SpO2:  [89 %-98 %] 98 %  BP: (121-156)/() 130/70     Weight: 121.6 kg (268 lb 1.3 oz)  Body mass index is 39.59 kg/m².    Physical Exam  Constitutional:       Appearance: She is ill-appearing. She is not diaphoretic.   HENT:      Head: Normocephalic and atraumatic.      Nose: Congestion present.   Eyes:      Pupils: Pupils are equal, round, and reactive to light.   Neck:      Musculoskeletal: Normal range of motion and neck supple.   Cardiovascular:      Rate and Rhythm: Tachycardia present.      Pulses: Normal pulses.      Heart sounds: Normal heart sounds.   Pulmonary:      Breath sounds: Rales (coarse R>L) present.      Comments: Tachypnea  Chest:      Chest wall: Tenderness (right sided) present.   Abdominal:      General: Bowel sounds are normal.      Palpations: Abdomen is soft.      Tenderness: There is no abdominal tenderness. There is no right CVA tenderness, left CVA tenderness or guarding.   Musculoskeletal: Normal range of motion.      Right lower leg: No edema.      Left lower leg: No edema.   Skin:     General: Skin is warm and dry.      Capillary Refill: Capillary refill takes less than 2 seconds.   Neurological:      Mental Status: She is alert and oriented to person, place, and time.   Psychiatric:         Mood and Affect: Mood normal.         Thought Content: Thought content normal.           CRANIAL NERVES     CN III, IV, VI   Pupils are equal, round, and reactive to light.       Significant Labs:   CBC:   Recent Labs   Lab 07/26/20  0128   WBC 15.53*   HGB 13.3   HCT 39.1   *     CMP:   Recent Labs   Lab 07/26/20  0128      K 3.2*      CO2 25   GLU 99   BUN 9   CREATININE 0.8   CALCIUM 9.1   PROT 7.0   ALBUMIN 3.7   BILITOT 0.9   ALKPHOS 67   AST 10   ALT 6*   ANIONGAP 7*   EGFRNONAA >60     Cardiac Markers: No  results for input(s): CKMB, MYOGLOBIN, BNP, TROPISTAT in the last 48 hours.  Coagulation:   Recent Labs   Lab 07/26/20  0128   INR 1.0   APTT 35.1*     Lactic Acid:   Recent Labs   Lab 07/26/20  0408   LACTATE 2.1     POCT Glucose: No results for input(s): POCTGLUCOSE in the last 48 hours.  Respiratory Culture: No results for input(s): GSRESP, RESPIRATORYC in the last 48 hours.  Troponin:   Recent Labs   Lab 07/26/20  0128   TROPONINI 0.007     Urine Studies: No results for input(s): COLORU, APPEARANCEUA, PHUR, SPECGRAV, PROTEINUA, GLUCUA, KETONESU, BILIRUBINUA, OCCULTUA, NITRITE, UROBILINOGEN, LEUKOCYTESUR, RBCUA, WBCUA, BACTERIA, SQUAMEPITHEL, HYALINECASTS in the last 48 hours.    Invalid input(s): WRIGHTSUR  All pertinent labs within the past 24 hours have been reviewed.    Significant Imaging: I have reviewed and interpreted all pertinent imaging results/findings within the past 24 hours.    Assessment/Plan:     * Sepsis  Meet sepsis criteria with fever, tachycardia, WBC, and source of infection as pneumonia. Covid negative. IVF bolus 1L given in ED.  BP stable. Lactate normal    - Continue IVF  - On Rocephin and Azithromycin  - BC and sputum gram stain and culture pending    Pneumonia of right lower lobe due to infectious organism  Has fever 102, hemoptysis, hypoxic. Chest xray showed right lower lobe pneumonia. Leukocytosis WBC 15. Oxygen sats drop to 89% on room air. Right-sided chest pain worse with cough and deep breathing. Per patient, COVID negative in an urgent care, and again negative in ED. Was diagnosed with pneumonia and given Azithromycin. Oxygen sats now 98% on 3L NC    - Add Rocephin and continue Azithromycin  - Check Procalcitonin, LD, Ferritin, CRP, Fibrinogen.  - Consider COVID routine screening pending labs  - Continuous pulse ox  - Oxygen supplemental as needed  - BC and sputum culture pending      Thrombocytopenia  Chronic with baseline from 119-139. Plt today 129. Elevated aPTT 35.1.     -  Check fibrinogen level.      Positive D dimer  D-Dimer elevated 1.36 with hypoxic. CTA chest no PE.        VTE Risk Mitigation (From admission, onward)         Ordered     Place ITA hose  Until discontinued      07/26/20 0751     IP VTE HIGH RISK PATIENT  Once      07/26/20 0609     Place sequential compression device  Until discontinued      07/26/20 0609                   Blaze Post NP  Department of Hospital Medicine   [facility]

## 2020-07-26 NOTE — HPI
This is 29 year old with sickle cell trait and thrombocytopenia who presents to the hospital with recent diagnosed with pneumonia and hemoptysis. Patient reports she has been having bad allergy symptoms since last week with bad congestion and productive cough. She states she woke up yesterday morning feeling unwell and had a fever of 102. She went to an urgent care where she was diagnosed with pneumonia, she was prescribed azithromycin.  She states after returning home she took her first dose of azithromycin and started having cough with blood in the sputum. She states she also has right-sided sharp chest pain with radiating to her upper right back, which worse with coughing and deep breathing. Chest pain improved with Tylenol and Motrin at home. She denies any abdominal pain, headache, loss of taste, N/V/D, dysuria. She denies previous history of hemoptysis, denies previous history of incarceration or residing in a homeless shelter or previous diagnosis of tuberculosis.  Patient also reports she was tested for COVID earlier today and states the result was negative, however, she has been getting emails from work about multiple people who have tested positive recently. She has Mirena IUD.    In ED, COVID negative. Chest xray with right lower lobe pneumonia.  Leukocytosis 15. Elevated D-Dimer. Elevated aPTT. Lactate negative. CTA chest without no PE.    Patient is admitted for sepsis/pneumonia

## 2020-07-26 NOTE — NURSING
Pt arrived from ED via stretcher with bolus hanging and ambulated to bed with no assist. Tele box was applied and VS taken. Pt appears in NAD at this time, will continue with current plan of care.

## 2020-07-27 PROBLEM — J96.01 ACUTE RESPIRATORY FAILURE WITH HYPOXIA: Status: ACTIVE | Noted: 2020-07-27

## 2020-07-27 LAB
ALBUMIN SERPL BCP-MCNC: 2.9 G/DL (ref 3.5–5.2)
ALP SERPL-CCNC: 61 U/L (ref 55–135)
ALT SERPL W/O P-5'-P-CCNC: 7 U/L (ref 10–44)
AMPHET+METHAMPHET UR QL: NEGATIVE
ANION GAP SERPL CALC-SCNC: 6 MMOL/L (ref 8–16)
AST SERPL-CCNC: 8 U/L (ref 10–40)
BARBITURATES UR QL SCN>200 NG/ML: NEGATIVE
BASOPHILS # BLD AUTO: 0.03 K/UL (ref 0–0.2)
BASOPHILS NFR BLD: 0.2 % (ref 0–1.9)
BENZODIAZ UR QL SCN>200 NG/ML: NEGATIVE
BILIRUB SERPL-MCNC: 0.5 MG/DL (ref 0.1–1)
BUN SERPL-MCNC: 6 MG/DL (ref 6–20)
BZE UR QL SCN: NEGATIVE
CALCIUM SERPL-MCNC: 8.4 MG/DL (ref 8.7–10.5)
CANNABINOIDS UR QL SCN: NEGATIVE
CHLORIDE SERPL-SCNC: 108 MMOL/L (ref 95–110)
CO2 SERPL-SCNC: 24 MMOL/L (ref 23–29)
CREAT SERPL-MCNC: 0.7 MG/DL (ref 0.5–1.4)
CREAT UR-MCNC: 59.9 MG/DL (ref 15–325)
DIFFERENTIAL METHOD: ABNORMAL
EOSINOPHIL # BLD AUTO: 0.2 K/UL (ref 0–0.5)
EOSINOPHIL NFR BLD: 1.1 % (ref 0–8)
ERYTHROCYTE [DISTWIDTH] IN BLOOD BY AUTOMATED COUNT: 14.3 % (ref 11.5–14.5)
EST. GFR  (AFRICAN AMERICAN): >60 ML/MIN/1.73 M^2
EST. GFR  (NON AFRICAN AMERICAN): >60 ML/MIN/1.73 M^2
GLUCOSE SERPL-MCNC: 94 MG/DL (ref 70–110)
HCT VFR BLD AUTO: 33.1 % (ref 37–48.5)
HGB BLD-MCNC: 11 G/DL (ref 12–16)
IMM GRANULOCYTES # BLD AUTO: 0.11 K/UL (ref 0–0.04)
IMM GRANULOCYTES NFR BLD AUTO: 0.6 % (ref 0–0.5)
LYMPHOCYTES # BLD AUTO: 2.8 K/UL (ref 1–4.8)
LYMPHOCYTES NFR BLD: 14.3 % (ref 18–48)
MAGNESIUM SERPL-MCNC: 1.8 MG/DL (ref 1.6–2.6)
MCH RBC QN AUTO: 28.9 PG (ref 27–31)
MCHC RBC AUTO-ENTMCNC: 33.2 G/DL (ref 32–36)
MCV RBC AUTO: 87 FL (ref 82–98)
METHADONE UR QL SCN>300 NG/ML: NEGATIVE
MONOCYTES # BLD AUTO: 0.7 K/UL (ref 0.3–1)
MONOCYTES NFR BLD: 3.6 % (ref 4–15)
NEUTROPHILS # BLD AUTO: 15.5 K/UL (ref 1.8–7.7)
NEUTROPHILS NFR BLD: 80.2 % (ref 38–73)
NRBC BLD-RTO: 0 /100 WBC
OPIATES UR QL SCN: NEGATIVE
PCP UR QL SCN>25 NG/ML: NEGATIVE
PHOSPHATE SERPL-MCNC: 2.5 MG/DL (ref 2.7–4.5)
PLATELET # BLD AUTO: 142 K/UL (ref 150–350)
PMV BLD AUTO: 11.7 FL (ref 9.2–12.9)
POTASSIUM SERPL-SCNC: 3.4 MMOL/L (ref 3.5–5.1)
PROT SERPL-MCNC: 6.3 G/DL (ref 6–8.4)
RBC # BLD AUTO: 3.81 M/UL (ref 4–5.4)
SARS-COV-2 RNA RESP QL NAA+PROBE: NOT DETECTED
SODIUM SERPL-SCNC: 138 MMOL/L (ref 136–145)
TOXICOLOGY INFORMATION: NORMAL
WBC # BLD AUTO: 19.33 K/UL (ref 3.9–12.7)

## 2020-07-27 PROCEDURE — 85025 COMPLETE CBC W/AUTO DIFF WBC: CPT

## 2020-07-27 PROCEDURE — 80053 COMPREHEN METABOLIC PANEL: CPT

## 2020-07-27 PROCEDURE — 25000242 PHARM REV CODE 250 ALT 637 W/ HCPCS: Performed by: HOSPITALIST

## 2020-07-27 PROCEDURE — 83735 ASSAY OF MAGNESIUM: CPT

## 2020-07-27 PROCEDURE — 36415 COLL VENOUS BLD VENIPUNCTURE: CPT

## 2020-07-27 PROCEDURE — 63700000 PHARM REV CODE 250 ALT 637 W/O HCPCS: Performed by: EMERGENCY MEDICINE

## 2020-07-27 PROCEDURE — 63600175 PHARM REV CODE 636 W HCPCS: Performed by: EMERGENCY MEDICINE

## 2020-07-27 PROCEDURE — 25000003 PHARM REV CODE 250: Performed by: EMERGENCY MEDICINE

## 2020-07-27 PROCEDURE — 25000003 PHARM REV CODE 250: Performed by: NURSE PRACTITIONER

## 2020-07-27 PROCEDURE — 80307 DRUG TEST PRSMV CHEM ANLYZR: CPT

## 2020-07-27 PROCEDURE — 84100 ASSAY OF PHOSPHORUS: CPT

## 2020-07-27 PROCEDURE — 11000001 HC ACUTE MED/SURG PRIVATE ROOM

## 2020-07-27 PROCEDURE — 63600175 PHARM REV CODE 636 W HCPCS: Performed by: HOSPITALIST

## 2020-07-27 RX ADMIN — BENZONATATE 100 MG: 100 CAPSULE ORAL at 08:07

## 2020-07-27 RX ADMIN — CEFTRIAXONE 1 G: 1 INJECTION, SOLUTION INTRAVENOUS at 05:07

## 2020-07-27 RX ADMIN — AZITHROMYCIN MONOHYDRATE 250 MG: 250 TABLET ORAL at 10:07

## 2020-07-27 RX ADMIN — ACETAMINOPHEN 650 MG: 325 TABLET ORAL at 05:07

## 2020-07-27 RX ADMIN — ENOXAPARIN SODIUM 40 MG: 40 INJECTION SUBCUTANEOUS at 04:07

## 2020-07-27 RX ADMIN — DEXAMETHASONE 6 MG: 2 TABLET ORAL at 10:07

## 2020-07-27 NOTE — ASSESSMENT & PLAN NOTE
Has fever 102, hemoptysis, hypoxic. Chest xray showed right lower lobe pneumonia. Leukocytosis WBC 15. Oxygen sats drop to 89% on room air. Right-sided chest pain worse with cough and deep breathing. Per patient, COVID negative in an urgent care, and again negative in ED. Was diagnosed with pneumonia and given Azithromycin. Oxygen sats now 98% on 3L NC    - Add Rocephin to Azithromycin coverage  - BC and sputum culture pending, COVID pending  - supplemental O2 to maintain sats > 92    No risk factors for MRSA or Pseudomonas

## 2020-07-27 NOTE — PROGRESS NOTES
[facility] Hospital Medicine  Progress Note    Patient Name: Lubna Ty  MRN: [id number]  Patient Class: IP- Inpatient   Admission Date: 7/26/2020  Length of Stay: 1 days  Attending Physician: Elie Maldonado MD  Primary Care Provider: Primary Doctor No        Subjective:     Principal Problem:Sepsis        HPI:  This is 29 year old with sickle cell trait and thrombocytopenia who presents to the hospital with recent diagnosed with pneumonia and hemoptysis. Patient reports she has been having bad allergy symptoms since last week with bad congestion and productive cough. She states she woke up yesterday morning feeling unwell and had a fever of 102. She went to an urgent care where she was diagnosed with pneumonia, she was prescribed azithromycin.  She states after returning home she took her first dose of azithromycin and started having cough with blood in the sputum. She states she also has right-sided sharp chest pain with radiating to her upper right back, which worse with coughing and deep breathing. Chest pain improved with Tylenol and Motrin at home. She denies any abdominal pain, headache, loss of taste, N/V/D, dysuria. She denies previous history of hemoptysis, denies previous history of incarceration or residing in a homeless shelter or previous diagnosis of tuberculosis.  Patient also reports she was tested for COVID earlier today and states the result was negative, however, she has been getting emails from work about multiple people who have tested positive recently. She has Mirena IUD.    In ED, COVID negative. Chest xray with right lower lobe pneumonia.  Leukocytosis 15. Elevated D-Dimer. Elevated aPTT. Lactate negative. CTA chest without no PE.    Patient is admitted for sepsis/pneumonia        Overview/Hospital Course:  No notes on file    Interval History: Breathing has improved, still with some cough and right sided pleuritic chest pain. Has h/o contact with COVID+  coworkers    Review of Systems   Constitutional: Positive for appetite change and fever.   Respiratory: Positive for cough and shortness of breath.    Cardiovascular: Positive for chest pain.   Gastrointestinal: Negative for abdominal pain.   Genitourinary: Negative for dysuria.   Neurological: Negative for light-headedness.     Objective:     Vital Signs (Most Recent):  Temp: 98.2 °F (36.8 °C) (07/27/20 1048)  Pulse: 83 (07/27/20 1048)  Resp: 17 (07/27/20 1048)  BP: 139/64 (07/27/20 1048)  SpO2: 95 % (07/27/20 1048) Vital Signs (24h Range):  Temp:  [98.2 °F (36.8 °C)-99.3 °F (37.4 °C)] 98.2 °F (36.8 °C)  Pulse:  [] 83  Resp:  [17-20] 17  SpO2:  [91 %-98 %] 95 %  BP: (115-139)/(61-73) 139/64     Weight: 121.6 kg (268 lb 1.3 oz)  Body mass index is 39.59 kg/m².    Intake/Output Summary (Last 24 hours) at 7/27/2020 1231  Last data filed at 7/27/2020 0830  Gross per 24 hour   Intake 480 ml   Output --   Net 480 ml      Physical Exam  Vitals signs and nursing note reviewed.   Constitutional:       General: She is not in acute distress.     Appearance: She is well-developed.   Eyes:      Conjunctiva/sclera: Conjunctivae normal.   Neck:      Musculoskeletal: Neck supple.      Vascular: No JVD.   Cardiovascular:      Rate and Rhythm: Normal rate and regular rhythm.      Heart sounds: Normal heart sounds.   Pulmonary:      Effort: Pulmonary effort is normal.      Breath sounds: Rhonchi and rales present. No wheezing.   Abdominal:      General: There is no distension.      Palpations: Abdomen is soft.      Tenderness: There is no abdominal tenderness. There is no rebound.   Musculoskeletal:         General: No deformity.   Skin:     General: Skin is warm and dry.      Findings: No rash.   Neurological:      Mental Status: She is alert and oriented to person, place, and time.         Significant Labs: All pertinent labs within the past 24 hours have been reviewed.    Significant Imaging: I have reviewed and interpreted  all pertinent imaging results/findings within the past 24 hours.      Assessment/Plan:      * Sepsis  As above, monitor sepsis physiology    Acute respiratory failure with hypoxia  Improved with O2, due to multilobar pneumonia, monitor and wean O2 as able      Positive D dimer  D-Dimer elevated 1.36 with hypoxic. CTA chest no PE.      Thrombocytopenia  Chronic with baseline from 119-139. Stable, monitor      Pneumonia of right lower lobe due to infectious organism  Has fever 102, hemoptysis, hypoxic. Chest xray showed right lower lobe pneumonia. Leukocytosis WBC 15. Oxygen sats drop to 89% on room air. Right-sided chest pain worse with cough and deep breathing. Per patient, COVID negative in an urgent care, and again negative in ED. Was diagnosed with pneumonia and given Azithromycin. Oxygen sats now 98% on 3L NC    - Add Rocephin to Azithromycin coverage  - BC and sputum culture pending, COVID pending  - supplemental O2 to maintain sats > 92    No risk factors for MRSA or Pseudomonas        VTE Risk Mitigation (From admission, onward)         Ordered     enoxaparin injection 40 mg  Every 24 hours      07/26/20 1622     Place ITA hose  Until discontinued      07/26/20 0751     IP VTE HIGH RISK PATIENT  Once      07/26/20 0609     Place sequential compression device  Until discontinued      07/26/20 0609                      Elie Maldonado MD  Department of Hospital Medicine   [facility]

## 2020-07-27 NOTE — PLAN OF CARE
Aox4. Able to repsoition self independently. Remains free from falls. Denies pain. C/o headache. Tylenol given. 1L O2 continued per NC, O2 sats above 93%. No distress noted.  Problem: Adult Inpatient Plan of Care  Goal: Plan of Care Review  Outcome: Ongoing, Progressing  Goal: Patient-Specific Goal (Individualization)  Outcome: Ongoing, Progressing  Goal: Absence of Hospital-Acquired Illness or Injury  Outcome: Ongoing, Progressing  Goal: Optimal Comfort and Wellbeing  Outcome: Ongoing, Progressing  Goal: Readiness for Transition of Care  Outcome: Ongoing, Progressing  Goal: Rounds/Family Conference  Outcome: Ongoing, Progressing     Problem: Respiratory Compromise (Sepsis/Septic Shock)  Goal: Effective Oxygenation and Ventilation  Outcome: Ongoing, Progressing

## 2020-07-27 NOTE — SUBJECTIVE & OBJECTIVE
Interval History: Breathing has improved, still with some cough and right sided pleuritic chest pain. Has h/o contact with COVID+ coworkers    Review of Systems   Constitutional: Positive for appetite change and fever.   Respiratory: Positive for cough and shortness of breath.    Cardiovascular: Positive for chest pain.   Gastrointestinal: Negative for abdominal pain.   Genitourinary: Negative for dysuria.   Neurological: Negative for light-headedness.     Objective:     Vital Signs (Most Recent):  Temp: 98.2 °F (36.8 °C) (07/27/20 1048)  Pulse: 83 (07/27/20 1048)  Resp: 17 (07/27/20 1048)  BP: 139/64 (07/27/20 1048)  SpO2: 95 % (07/27/20 1048) Vital Signs (24h Range):  Temp:  [98.2 °F (36.8 °C)-99.3 °F (37.4 °C)] 98.2 °F (36.8 °C)  Pulse:  [] 83  Resp:  [17-20] 17  SpO2:  [91 %-98 %] 95 %  BP: (115-139)/(61-73) 139/64     Weight: 121.6 kg (268 lb 1.3 oz)  Body mass index is 39.59 kg/m².    Intake/Output Summary (Last 24 hours) at 7/27/2020 1231  Last data filed at 7/27/2020 0830  Gross per 24 hour   Intake 480 ml   Output --   Net 480 ml      Physical Exam  Vitals signs and nursing note reviewed.   Constitutional:       General: She is not in acute distress.     Appearance: She is well-developed.   Eyes:      Conjunctiva/sclera: Conjunctivae normal.   Neck:      Musculoskeletal: Neck supple.      Vascular: No JVD.   Cardiovascular:      Rate and Rhythm: Normal rate and regular rhythm.      Heart sounds: Normal heart sounds.   Pulmonary:      Effort: Pulmonary effort is normal.      Breath sounds: Rhonchi and rales present. No wheezing.   Abdominal:      General: There is no distension.      Palpations: Abdomen is soft.      Tenderness: There is no abdominal tenderness. There is no rebound.   Musculoskeletal:         General: No deformity.   Skin:     General: Skin is warm and dry.      Findings: No rash.   Neurological:      Mental Status: She is alert and oriented to person, place, and time.          Significant Labs: All pertinent labs within the past 24 hours have been reviewed.    Significant Imaging: I have reviewed and interpreted all pertinent imaging results/findings within the past 24 hours.

## 2020-07-28 PROBLEM — E66.9 OBESITY: Status: ACTIVE | Noted: 2020-07-28

## 2020-07-28 LAB
ALBUMIN SERPL BCP-MCNC: 3.2 G/DL (ref 3.5–5.2)
ALP SERPL-CCNC: 92 U/L (ref 55–135)
ALT SERPL W/O P-5'-P-CCNC: 11 U/L (ref 10–44)
ANION GAP SERPL CALC-SCNC: 7 MMOL/L (ref 8–16)
AST SERPL-CCNC: 12 U/L (ref 10–40)
BACTERIA BLD CULT: ABNORMAL
BASOPHILS # BLD AUTO: 0.02 K/UL (ref 0–0.2)
BASOPHILS NFR BLD: 0.1 % (ref 0–1.9)
BILIRUB SERPL-MCNC: 0.5 MG/DL (ref 0.1–1)
BILIRUB UR QL STRIP: NEGATIVE
BUN SERPL-MCNC: 8 MG/DL (ref 6–20)
CALCIUM SERPL-MCNC: 9.4 MG/DL (ref 8.7–10.5)
CHLORIDE SERPL-SCNC: 106 MMOL/L (ref 95–110)
CLARITY UR: CLEAR
CO2 SERPL-SCNC: 25 MMOL/L (ref 23–29)
COLOR UR: ABNORMAL
CREAT SERPL-MCNC: 0.7 MG/DL (ref 0.5–1.4)
DIFFERENTIAL METHOD: ABNORMAL
EOSINOPHIL # BLD AUTO: 0 K/UL (ref 0–0.5)
EOSINOPHIL NFR BLD: 0 % (ref 0–8)
ERYTHROCYTE [DISTWIDTH] IN BLOOD BY AUTOMATED COUNT: 13.9 % (ref 11.5–14.5)
EST. GFR  (AFRICAN AMERICAN): >60 ML/MIN/1.73 M^2
EST. GFR  (NON AFRICAN AMERICAN): >60 ML/MIN/1.73 M^2
GLUCOSE SERPL-MCNC: 108 MG/DL (ref 70–110)
GLUCOSE UR QL STRIP: NEGATIVE
HCT VFR BLD AUTO: 35.1 % (ref 37–48.5)
HGB BLD-MCNC: 11.6 G/DL (ref 12–16)
HGB UR QL STRIP: ABNORMAL
HIV1+2 IGG SERPL QL IA.RAPID: NORMAL
IMM GRANULOCYTES # BLD AUTO: 0.08 K/UL (ref 0–0.04)
IMM GRANULOCYTES NFR BLD AUTO: 0.5 % (ref 0–0.5)
KETONES UR QL STRIP: NEGATIVE
LEUKOCYTE ESTERASE UR QL STRIP: ABNORMAL
LYMPHOCYTES # BLD AUTO: 1.8 K/UL (ref 1–4.8)
LYMPHOCYTES NFR BLD: 9.9 % (ref 18–48)
MAGNESIUM SERPL-MCNC: 2.1 MG/DL (ref 1.6–2.6)
MCH RBC QN AUTO: 28.1 PG (ref 27–31)
MCHC RBC AUTO-ENTMCNC: 33 G/DL (ref 32–36)
MCV RBC AUTO: 85 FL (ref 82–98)
MICROSCOPIC COMMENT: NORMAL
MONOCYTES # BLD AUTO: 0.4 K/UL (ref 0.3–1)
MONOCYTES NFR BLD: 2.5 % (ref 4–15)
NEUTROPHILS # BLD AUTO: 15.4 K/UL (ref 1.8–7.7)
NEUTROPHILS NFR BLD: 87 % (ref 38–73)
NITRITE UR QL STRIP: NEGATIVE
NRBC BLD-RTO: 0 /100 WBC
PH UR STRIP: 8 [PH] (ref 5–8)
PHOSPHATE SERPL-MCNC: 3.5 MG/DL (ref 2.7–4.5)
PLATELET # BLD AUTO: 188 K/UL (ref 150–350)
PMV BLD AUTO: 11.5 FL (ref 9.2–12.9)
POTASSIUM SERPL-SCNC: 4 MMOL/L (ref 3.5–5.1)
PROT SERPL-MCNC: 6.8 G/DL (ref 6–8.4)
PROT UR QL STRIP: NEGATIVE
RBC # BLD AUTO: 4.13 M/UL (ref 4–5.4)
RBC #/AREA URNS HPF: 0 /HPF (ref 0–4)
SODIUM SERPL-SCNC: 138 MMOL/L (ref 136–145)
SP GR UR STRIP: 1 (ref 1–1.03)
URN SPEC COLLECT METH UR: ABNORMAL
UROBILINOGEN UR STRIP-ACNC: NEGATIVE EU/DL
WBC # BLD AUTO: 17.65 K/UL (ref 3.9–12.7)
WBC #/AREA URNS HPF: 0 /HPF (ref 0–5)

## 2020-07-28 PROCEDURE — 63700000 PHARM REV CODE 250 ALT 637 W/O HCPCS: Performed by: EMERGENCY MEDICINE

## 2020-07-28 PROCEDURE — 83735 ASSAY OF MAGNESIUM: CPT

## 2020-07-28 PROCEDURE — 25000242 PHARM REV CODE 250 ALT 637 W/ HCPCS: Performed by: HOSPITALIST

## 2020-07-28 PROCEDURE — 81000 URINALYSIS NONAUTO W/SCOPE: CPT

## 2020-07-28 PROCEDURE — 36415 COLL VENOUS BLD VENIPUNCTURE: CPT

## 2020-07-28 PROCEDURE — 25000003 PHARM REV CODE 250: Performed by: NURSE PRACTITIONER

## 2020-07-28 PROCEDURE — 11000001 HC ACUTE MED/SURG PRIVATE ROOM

## 2020-07-28 PROCEDURE — 25000003 PHARM REV CODE 250: Performed by: EMERGENCY MEDICINE

## 2020-07-28 PROCEDURE — 63600175 PHARM REV CODE 636 W HCPCS: Performed by: HOSPITALIST

## 2020-07-28 PROCEDURE — 84100 ASSAY OF PHOSPHORUS: CPT

## 2020-07-28 PROCEDURE — 86703 HIV-1/HIV-2 1 RESULT ANTBDY: CPT

## 2020-07-28 PROCEDURE — 80053 COMPREHEN METABOLIC PANEL: CPT

## 2020-07-28 PROCEDURE — 94761 N-INVAS EAR/PLS OXIMETRY MLT: CPT

## 2020-07-28 PROCEDURE — 85025 COMPLETE CBC W/AUTO DIFF WBC: CPT

## 2020-07-28 PROCEDURE — 87040 BLOOD CULTURE FOR BACTERIA: CPT | Mod: 59

## 2020-07-28 PROCEDURE — 63600175 PHARM REV CODE 636 W HCPCS: Performed by: INTERNAL MEDICINE

## 2020-07-28 RX ORDER — GUAIFENESIN 100 MG/5ML
200 SOLUTION ORAL EVERY 4 HOURS PRN
Status: DISCONTINUED | OUTPATIENT
Start: 2020-07-28 | End: 2020-07-29 | Stop reason: HOSPADM

## 2020-07-28 RX ADMIN — ENOXAPARIN SODIUM 40 MG: 40 INJECTION SUBCUTANEOUS at 05:07

## 2020-07-28 RX ADMIN — BENZONATATE 100 MG: 100 CAPSULE ORAL at 05:07

## 2020-07-28 RX ADMIN — DEXAMETHASONE 6 MG: 2 TABLET ORAL at 08:07

## 2020-07-28 RX ADMIN — BENZONATATE 100 MG: 100 CAPSULE ORAL at 02:07

## 2020-07-28 RX ADMIN — AZITHROMYCIN MONOHYDRATE 250 MG: 250 TABLET ORAL at 08:07

## 2020-07-28 RX ADMIN — SODIUM CHLORIDE: 0.9 INJECTION, SOLUTION INTRAVENOUS at 02:07

## 2020-07-28 RX ADMIN — CEFTRIAXONE 2 G: 2 INJECTION, SOLUTION INTRAVENOUS at 04:07

## 2020-07-28 NOTE — HOSPITAL COURSE
Patient presented with cough to the ED and was found to have pneumonia. Covid negative.  Patient grew out Strep Pneumonia on blood cultures from 7/26.  Repeat blood cultures were sent on 7/28.   Patient had an uncomplicated clinical course. On the day of discharge, she was requesting to go home. She looked great. No 02 requirements. Repeat blood cultures were NG.  She will be sent home on 2 weeks of Clindamycin. Activity as tolerated. Diet- regular. Follow up with a PCP in one week. Elevated WBC count likely from steroids.

## 2020-07-28 NOTE — PROGRESS NOTES
[facility] Hospital Medicine  Progress Note    Patient Name: Lubna Ty  MRN: [id number]  Patient Class: IP- Inpatient   Admission Date: 7/26/2020  Length of Stay: 2 days  Attending Physician: Ady Loyola MD  Primary Care Provider: Primary Doctor No        Subjective:     Principal Problem:Sepsis        HPI:  This is 29 year old with sickle cell trait and thrombocytopenia who presents to the hospital with recent diagnosed with pneumonia and hemoptysis. Patient reports she has been having bad allergy symptoms since last week with bad congestion and productive cough. She states she woke up yesterday morning feeling unwell and had a fever of 102. She went to an urgent care where she was diagnosed with pneumonia, she was prescribed azithromycin.  She states after returning home she took her first dose of azithromycin and started having cough with blood in the sputum. She states she also has right-sided sharp chest pain with radiating to her upper right back, which worse with coughing and deep breathing. Chest pain improved with Tylenol and Motrin at home. She denies any abdominal pain, headache, loss of taste, N/V/D, dysuria. She denies previous history of hemoptysis, denies previous history of incarceration or residing in a homeless shelter or previous diagnosis of tuberculosis.  Patient also reports she was tested for COVID earlier today and states the result was negative, however, she has been getting emails from work about multiple people who have tested positive recently. She has Mirena IUD.    In ED, COVID negative. Chest xray with right lower lobe pneumonia.  Leukocytosis 15. Elevated D-Dimer. Elevated aPTT. Lactate negative. CTA chest without no PE.    Patient is admitted for sepsis/pneumonia        Overview/Hospital Course:  Patient presented with cough to the ED and was found to have pneumonia. Covid negative.  Patient grew out Strep Pneumonia on blood cultures from 7/26.  Repeat  blood cultures were sent on 7/28.       Interval History: states she feels better. No SOB   Review of Systems   Constitutional: Negative for activity change.   HENT: Negative for congestion.    Respiratory: Positive for cough. Negative for chest tightness and shortness of breath.    Cardiovascular: Negative for chest pain.   Gastrointestinal: Negative for abdominal pain.   Genitourinary: Negative for difficulty urinating and dyspareunia.   Musculoskeletal: Negative for arthralgias.   Skin: Negative for color change.   Psychiatric/Behavioral: Negative for agitation and behavioral problems.     Objective:     Vital Signs (Most Recent):  Temp: 98.2 °F (36.8 °C) (07/28/20 0803)  Pulse: 82 (07/28/20 0803)  Resp: 17 (07/28/20 0803)  BP: 128/66 (07/28/20 0803)  SpO2: (!) 93 % (07/28/20 0803) Vital Signs (24h Range):  Temp:  [97.7 °F (36.5 °C)-98.5 °F (36.9 °C)] 98.2 °F (36.8 °C)  Pulse:  [77-91] 82  Resp:  [16-20] 17  SpO2:  [92 %-95 %] 93 %  BP: (128-139)/(63-70) 128/66     Weight: 121.6 kg (268 lb 1.3 oz)  Body mass index is 39.59 kg/m².    Intake/Output Summary (Last 24 hours) at 7/28/2020 0851  Last data filed at 7/27/2020 2200  Gross per 24 hour   Intake 600 ml   Output --   Net 600 ml      Physical Exam  Vitals signs and nursing note reviewed.   Constitutional:       General: She is not in acute distress.     Appearance: Normal appearance. She is obese. She is not ill-appearing, toxic-appearing or diaphoretic.   Pulmonary:      Effort: No respiratory distress.   Neurological:      Mental Status: She is alert and oriented to person, place, and time.   Psychiatric:         Mood and Affect: Mood normal.         Behavior: Behavior normal.         Significant Labs:   BMP:   Recent Labs   Lab 07/28/20 0412         K 4.0      CO2 25   BUN 8   CREATININE 0.7   CALCIUM 9.4   MG 2.1     CBC:   Recent Labs   Lab 07/27/20  0624 07/28/20  0412   WBC 19.33* 17.65*   HGB 11.0* 11.6*   HCT 33.1* 35.1*   *  188       Significant Imaging:       Assessment/Plan:      * Sepsis  As above, monitor sepsis physiology    Sepsis has resolved.    Obesity  Body mass index is 39.59 kg/m².  Weight loss as out patient.       Acute respiratory failure with hypoxia  Improved with O2, due to multilobar pneumonia, monitor and wean O2 as able      Positive D dimer  D-Dimer elevated 1.36 with hypoxic. CTA chest no PE.      Thrombocytopenia  Chronic with baseline from 119-139. Stable, monitor      Pneumonia of right lower lobe due to infectious organism  Has fever 102, hemoptysis, hypoxic. Chest xray showed right lower lobe pneumonia. Leukocytosis WBC 15. Oxygen sats drop to 89% on room air. Right-sided chest pain worse with cough and deep breathing. Per patient, COVID negative in an urgent care, and again negative in ED. Was diagnosed with pneumonia and given Azithromycin. Oxygen sats now 98% on 3L NC    - Add Rocephin to Azithromycin coverage  - BC and sputum culture pending, COVID pending  - supplemental O2 to maintain sats > 92    No risk factors for MRSA or Pseudomonas    Strep pneumo on blood cultures. Will d/c Azithromycin .   Check an HIV         Dysuria- doubt UTI as she has been on Ceftriaxone- but will send for a UA.     VTE Risk Mitigation (From admission, onward)         Ordered     enoxaparin injection 40 mg  Every 24 hours      07/26/20 1622     Place ITA hose  Until discontinued      07/26/20 0751     IP VTE HIGH RISK PATIENT  Once      07/26/20 0609     Place sequential compression device  Until discontinued      07/26/20 0609              Repeat blood cultures from today pending. Home possibly tomorrow with 2 weeks of Abx.         Ady Zepeda MD  Department of Hospital Medicine   [facility]

## 2020-07-28 NOTE — SUBJECTIVE & OBJECTIVE
Interval History: states she feels better. No SOB   Review of Systems   Constitutional: Negative for activity change.   HENT: Negative for congestion.    Respiratory: Positive for cough. Negative for chest tightness and shortness of breath.    Cardiovascular: Negative for chest pain.   Gastrointestinal: Negative for abdominal pain.   Genitourinary: Negative for difficulty urinating and dyspareunia.   Musculoskeletal: Negative for arthralgias.   Skin: Negative for color change.   Psychiatric/Behavioral: Negative for agitation and behavioral problems.     Objective:     Vital Signs (Most Recent):  Temp: 98.2 °F (36.8 °C) (07/28/20 0803)  Pulse: 82 (07/28/20 0803)  Resp: 17 (07/28/20 0803)  BP: 128/66 (07/28/20 0803)  SpO2: (!) 93 % (07/28/20 0803) Vital Signs (24h Range):  Temp:  [97.7 °F (36.5 °C)-98.5 °F (36.9 °C)] 98.2 °F (36.8 °C)  Pulse:  [77-91] 82  Resp:  [16-20] 17  SpO2:  [92 %-95 %] 93 %  BP: (128-139)/(63-70) 128/66     Weight: 121.6 kg (268 lb 1.3 oz)  Body mass index is 39.59 kg/m².    Intake/Output Summary (Last 24 hours) at 7/28/2020 0851  Last data filed at 7/27/2020 2200  Gross per 24 hour   Intake 600 ml   Output --   Net 600 ml      Physical Exam  Vitals signs and nursing note reviewed.   Constitutional:       General: She is not in acute distress.     Appearance: Normal appearance. She is obese. She is not ill-appearing, toxic-appearing or diaphoretic.   Pulmonary:      Effort: No respiratory distress.   Neurological:      Mental Status: She is alert and oriented to person, place, and time.   Psychiatric:         Mood and Affect: Mood normal.         Behavior: Behavior normal.         Significant Labs:   BMP:   Recent Labs   Lab 07/28/20 0412         K 4.0      CO2 25   BUN 8   CREATININE 0.7   CALCIUM 9.4   MG 2.1     CBC:   Recent Labs   Lab 07/27/20  0624 07/28/20 0412   WBC 19.33* 17.65*   HGB 11.0* 11.6*   HCT 33.1* 35.1*   * 188       Significant Imaging:

## 2020-07-28 NOTE — ASSESSMENT & PLAN NOTE
Has fever 102, hemoptysis, hypoxic. Chest xray showed right lower lobe pneumonia. Leukocytosis WBC 15. Oxygen sats drop to 89% on room air. Right-sided chest pain worse with cough and deep breathing. Per patient, COVID negative in an urgent care, and again negative in ED. Was diagnosed with pneumonia and given Azithromycin. Oxygen sats now 98% on 3L NC    - Add Rocephin to Azithromycin coverage  - BC and sputum culture pending, COVID pending  - supplemental O2 to maintain sats > 92    No risk factors for MRSA or Pseudomonas    Strep pneumo on blood cultures. Will d/c Azithromycin .   Check an HIV

## 2020-07-28 NOTE — PLAN OF CARE
Aox4. Remains free from falls. Denies pain and SOB this shift. On RA c O2 sats 94% and above. IV abx given per orders. No distressnoted this shift. VSS.   Problem: Adult Inpatient Plan of Care  Goal: Plan of Care Review  Outcome: Ongoing, Progressing  Goal: Patient-Specific Goal (Individualization)  Outcome: Ongoing, Progressing  Goal: Absence of Hospital-Acquired Illness or Injury  Outcome: Ongoing, Progressing  Goal: Optimal Comfort and Wellbeing  Outcome: Ongoing, Progressing  Goal: Readiness for Transition of Care  Outcome: Ongoing, Progressing     Problem: Adjustment to Illness (Sepsis/Septic Shock)  Goal: Optimal Coping  Outcome: Ongoing, Progressing     Problem: Bleeding (Sepsis/Septic Shock)  Goal: Absence of Bleeding  Outcome: Ongoing, Progressing     Problem: Hemodynamic Instability (Sepsis/Septic Shock)  Goal: Effective Tissue Perfusion  Outcome: Ongoing, Progressing     Problem: Infection (Sepsis/Septic Shock)  Goal: Absence of Infection Signs/Symptoms  Outcome: Ongoing, Progressing     Problem: Nutrition Impaired (Sepsis/Septic Shock)  Goal: Optimal Nutrition Intake  Outcome: Ongoing, Progressing     Problem: Respiratory Compromise (Sepsis/Septic Shock)  Goal: Effective Oxygenation and Ventilation  Outcome: Ongoing, Progressing

## 2020-07-29 VITALS
HEART RATE: 69 BPM | OXYGEN SATURATION: 94 % | RESPIRATION RATE: 18 BRPM | WEIGHT: 268.06 LBS | HEIGHT: 69 IN | BODY MASS INDEX: 39.7 KG/M2 | TEMPERATURE: 98 F | DIASTOLIC BLOOD PRESSURE: 88 MMHG | SYSTOLIC BLOOD PRESSURE: 139 MMHG

## 2020-07-29 PROBLEM — J96.01 ACUTE RESPIRATORY FAILURE WITH HYPOXIA: Status: RESOLVED | Noted: 2020-07-27 | Resolved: 2020-07-29

## 2020-07-29 PROBLEM — A41.9 SEPSIS: Status: RESOLVED | Noted: 2020-07-26 | Resolved: 2020-07-29

## 2020-07-29 PROBLEM — R79.89 POSITIVE D DIMER: Status: RESOLVED | Noted: 2020-07-26 | Resolved: 2020-07-29

## 2020-07-29 LAB
ALBUMIN SERPL BCP-MCNC: 3.2 G/DL (ref 3.5–5.2)
ALP SERPL-CCNC: 77 U/L (ref 55–135)
ALT SERPL W/O P-5'-P-CCNC: 19 U/L (ref 10–44)
ANION GAP SERPL CALC-SCNC: 8 MMOL/L (ref 8–16)
AST SERPL-CCNC: 14 U/L (ref 10–40)
BASOPHILS # BLD AUTO: 0.04 K/UL (ref 0–0.2)
BASOPHILS NFR BLD: 0.2 % (ref 0–1.9)
BILIRUB SERPL-MCNC: 0.3 MG/DL (ref 0.1–1)
BUN SERPL-MCNC: 10 MG/DL (ref 6–20)
CALCIUM SERPL-MCNC: 8.9 MG/DL (ref 8.7–10.5)
CHLORIDE SERPL-SCNC: 107 MMOL/L (ref 95–110)
CO2 SERPL-SCNC: 24 MMOL/L (ref 23–29)
CREAT SERPL-MCNC: 0.8 MG/DL (ref 0.5–1.4)
DIFFERENTIAL METHOD: ABNORMAL
EOSINOPHIL # BLD AUTO: 0 K/UL (ref 0–0.5)
EOSINOPHIL NFR BLD: 0.1 % (ref 0–8)
ERYTHROCYTE [DISTWIDTH] IN BLOOD BY AUTOMATED COUNT: 14.1 % (ref 11.5–14.5)
EST. GFR  (AFRICAN AMERICAN): >60 ML/MIN/1.73 M^2
EST. GFR  (NON AFRICAN AMERICAN): >60 ML/MIN/1.73 M^2
GLUCOSE SERPL-MCNC: 69 MG/DL (ref 70–110)
HCT VFR BLD AUTO: 34.2 % (ref 37–48.5)
HGB BLD-MCNC: 11.5 G/DL (ref 12–16)
IMM GRANULOCYTES # BLD AUTO: 0.2 K/UL (ref 0–0.04)
IMM GRANULOCYTES NFR BLD AUTO: 1.1 % (ref 0–0.5)
LYMPHOCYTES # BLD AUTO: 4.1 K/UL (ref 1–4.8)
LYMPHOCYTES NFR BLD: 23 % (ref 18–48)
MAGNESIUM SERPL-MCNC: 1.8 MG/DL (ref 1.6–2.6)
MCH RBC QN AUTO: 28.3 PG (ref 27–31)
MCHC RBC AUTO-ENTMCNC: 33.6 G/DL (ref 32–36)
MCV RBC AUTO: 84 FL (ref 82–98)
MONOCYTES # BLD AUTO: 0.9 K/UL (ref 0.3–1)
MONOCYTES NFR BLD: 4.9 % (ref 4–15)
NEUTROPHILS # BLD AUTO: 12.5 K/UL (ref 1.8–7.7)
NEUTROPHILS NFR BLD: 70.7 % (ref 38–73)
NRBC BLD-RTO: 0 /100 WBC
PHOSPHATE SERPL-MCNC: 4.5 MG/DL (ref 2.7–4.5)
PLATELET # BLD AUTO: 219 K/UL (ref 150–350)
PMV BLD AUTO: 11.3 FL (ref 9.2–12.9)
POTASSIUM SERPL-SCNC: 3.9 MMOL/L (ref 3.5–5.1)
PROT SERPL-MCNC: 6.8 G/DL (ref 6–8.4)
RBC # BLD AUTO: 4.06 M/UL (ref 4–5.4)
SODIUM SERPL-SCNC: 139 MMOL/L (ref 136–145)
WBC # BLD AUTO: 17.63 K/UL (ref 3.9–12.7)

## 2020-07-29 PROCEDURE — 63600175 PHARM REV CODE 636 W HCPCS: Performed by: INTERNAL MEDICINE

## 2020-07-29 PROCEDURE — 84100 ASSAY OF PHOSPHORUS: CPT

## 2020-07-29 PROCEDURE — 25000242 PHARM REV CODE 250 ALT 637 W/ HCPCS: Performed by: HOSPITALIST

## 2020-07-29 PROCEDURE — 83735 ASSAY OF MAGNESIUM: CPT

## 2020-07-29 PROCEDURE — 36415 COLL VENOUS BLD VENIPUNCTURE: CPT

## 2020-07-29 PROCEDURE — 63600175 PHARM REV CODE 636 W HCPCS: Performed by: HOSPITALIST

## 2020-07-29 PROCEDURE — 94761 N-INVAS EAR/PLS OXIMETRY MLT: CPT

## 2020-07-29 PROCEDURE — 80053 COMPREHEN METABOLIC PANEL: CPT

## 2020-07-29 PROCEDURE — 85025 COMPLETE CBC W/AUTO DIFF WBC: CPT

## 2020-07-29 RX ORDER — CLINDAMYCIN HYDROCHLORIDE 300 MG/1
300 CAPSULE ORAL EVERY 6 HOURS
Qty: 48 CAPSULE | Refills: 0 | Status: SHIPPED | OUTPATIENT
Start: 2020-07-29 | End: 2020-08-10

## 2020-07-29 RX ADMIN — CEFTRIAXONE 2 G: 2 INJECTION, SOLUTION INTRAVENOUS at 04:07

## 2020-07-29 RX ADMIN — DEXAMETHASONE 6 MG: 2 TABLET ORAL at 08:07

## 2020-07-29 NOTE — PLAN OF CARE
Problem: Adult Inpatient Plan of Care  Goal: Plan of Care Review  Outcome: Met  Goal: Patient-Specific Goal (Individualization)  Outcome: Met  Goal: Absence of Hospital-Acquired Illness or Injury  Outcome: Met  Goal: Optimal Comfort and Wellbeing  Outcome: Met  Goal: Readiness for Transition of Care  Outcome: Met  Goal: Rounds/Family Conference  Outcome: Met     Problem: Adjustment to Illness (Sepsis/Septic Shock)  Goal: Optimal Coping  Outcome: Met     Problem: Bleeding (Sepsis/Septic Shock)  Goal: Absence of Bleeding  Outcome: Met     Problem: Glycemic Control Impaired (Sepsis/Septic Shock)  Goal: Blood Glucose Level Within Desired Range  Outcome: Met     Problem: Hemodynamic Instability (Sepsis/Septic Shock)  Goal: Effective Tissue Perfusion  Outcome: Met     Problem: Infection (Sepsis/Septic Shock)  Goal: Absence of Infection Signs/Symptoms  Outcome: Met     Problem: Nutrition Impaired (Sepsis/Septic Shock)  Goal: Optimal Nutrition Intake  Outcome: Met     Problem: Respiratory Compromise (Sepsis/Septic Shock)  Goal: Effective Oxygenation and Ventilation  Outcome: Met   Adequate for discharge.

## 2020-07-29 NOTE — SUBJECTIVE & OBJECTIVE
Interval History: doing great. Would like to go home.       Review of Systems   Constitutional: Negative for activity change.   HENT: Negative for congestion.    Respiratory: Positive for cough. Negative for chest tightness and shortness of breath.    Cardiovascular: Negative for chest pain.   Gastrointestinal: Negative for abdominal pain.   Genitourinary: Negative for difficulty urinating and dyspareunia.   Musculoskeletal: Negative for arthralgias.   Skin: Negative for color change.   Psychiatric/Behavioral: Negative for agitation and behavioral problems.     Objective:     Vital Signs (Most Recent):  Temp: 98 °F (36.7 °C) (07/29/20 0715)  Pulse: 76 (07/29/20 0715)  Resp: 18 (07/29/20 0715)  BP: 124/89 (07/29/20 0715)  SpO2: (!) 94 % (07/29/20 0715) Vital Signs (24h Range):  Temp:  [97.8 °F (36.6 °C)-98.6 °F (37 °C)] 98 °F (36.7 °C)  Pulse:  [60-88] 76  Resp:  [17-20] 18  SpO2:  [92 %-95 %] 94 %  BP: (122-131)/(58-89) 124/89     Weight: 121.6 kg (268 lb 1.3 oz)  Body mass index is 39.59 kg/m².    Intake/Output Summary (Last 24 hours) at 7/29/2020 1008  Last data filed at 7/28/2020 1730  Gross per 24 hour   Intake 480 ml   Output --   Net 480 ml      Physical Exam  Vitals signs and nursing note reviewed.   Constitutional:       General: She is not in acute distress.     Appearance: Normal appearance. She is obese. She is not ill-appearing, toxic-appearing or diaphoretic.   Pulmonary:      Effort: No respiratory distress.   Neurological:      Mental Status: She is alert and oriented to person, place, and time.   Psychiatric:         Mood and Affect: Mood normal.         Behavior: Behavior normal.         Significant Labs:   BMP:   Recent Labs   Lab 07/29/20  0534   GLU 69*      K 3.9      CO2 24   BUN 10   CREATININE 0.8   CALCIUM 8.9   MG 1.8     CBC:   Recent Labs   Lab 07/28/20  0412 07/29/20  0534   WBC 17.65* 17.63*   HGB 11.6* 11.5*   HCT 35.1* 34.2*    219       Significant Imaging:

## 2020-07-29 NOTE — PROGRESS NOTES
[facility] WRITTEN HEALTHCARE AND DISCHARGE INFORMATION:  .  Follow-up Information     [facility].    Why: out patient services: Lester with  clinic says a nurse will call the patient will recive a call by 2PM today for follow up from the hospital and to establish care.  Contact information:  [address] [facility] [place] [id number]  [phone]                   Help at Home           [phone]  After discharge for assistance [facility] On Call Nurse Care Line 24/7  Assistance   Things You are responsible For To Manage Your Care At Home:  1.    Getting your prescriptions filled   2.    Taking your medications as directed, DO NOT MISS ANY DOSES!  3.    Going to your follow-up doctor appointment. This is important because it  allow the doctor to monitor your progress and determine if  any changes need to made to your treatment plan.   Thank you for choosing [facility] for your care.  Please answer any calls you may receive from [facility] we want to continue to support you as you manage your healthcare needs. [facility] is happy to have the opportunity to serve you.    Sincerely,  Your [facility] Team,  Christie Moser RN, BSN, STN [facility]  7/29/2020  [phone]

## 2020-07-29 NOTE — ASSESSMENT & PLAN NOTE
Has fever 102, hemoptysis, hypoxic. Chest xray showed right lower lobe pneumonia. Leukocytosis WBC 15. Oxygen sats drop to 89% on room air. Right-sided chest pain worse with cough and deep breathing. Per patient, COVID negative in an urgent care, and again negative in ED. Was diagnosed with pneumonia and given Azithromycin. Oxygen sats now 98% on 3L NC    - Add Rocephin to Azithromycin coverage  - BC and sputum culture pending, COVID pending  - supplemental O2 to maintain sats > 92    No risk factors for MRSA or Pseudomonas    Strep pneumo on blood cultures. Will d/c Azithromycin .   Check an HIV- negative.     Repeat blood cultures NG. Aware of WBC count at 17 but patient looks great and would like to go home.   Will treat with 2 weeks of Clindamycin.

## 2020-07-29 NOTE — PLAN OF CARE
Aox4. Able to ambulate independently. Remains free from falls. Denies pain. No SOB reported this shift. Continued on RA. O2 sats above 93%. VSS. No distress noted. IV abx given per orders.   Problem: Adult Inpatient Plan of Care  Goal: Plan of Care Review  Outcome: Ongoing, Progressing  Goal: Patient-Specific Goal (Individualization)  Outcome: Ongoing, Progressing  Goal: Absence of Hospital-Acquired Illness or Injury  Outcome: Ongoing, Progressing  Goal: Optimal Comfort and Wellbeing  Outcome: Ongoing, Progressing  Goal: Readiness for Transition of Care  Outcome: Ongoing, Progressing     Problem: Adjustment to Illness (Sepsis/Septic Shock)  Goal: Optimal Coping  Outcome: Ongoing, Progressing     Problem: Hemodynamic Instability (Sepsis/Septic Shock)  Goal: Effective Tissue Perfusion  Outcome: Ongoing, Progressing     Problem: Infection (Sepsis/Septic Shock)  Goal: Absence of Infection Signs/Symptoms  Outcome: Ongoing, Progressing     Problem: Nutrition Impaired (Sepsis/Septic Shock)  Goal: Optimal Nutrition Intake  Outcome: Ongoing, Progressing     Problem: Respiratory Compromise (Sepsis/Septic Shock)  Goal: Effective Oxygenation and Ventilation  Outcome: Ongoing, Progressing

## 2020-07-29 NOTE — PLAN OF CARE
07/29/20 1113   Final Note   Assessment Type Final Discharge Note   Anticipated Discharge Disposition Home   What phone number can be called within the next 1-3 days to see how you are doing after discharge?   (see chart)   Hospital Follow Up  Appt(s) scheduled? Yes   Right Care Referral Info   Post Acute Recommendation No Care   Referral Type no care   Post-Acute Status   Discharge Delays None known at this time

## 2020-07-29 NOTE — PLAN OF CARE
11:00AM HOW TO MANAGE YOUR CARE  AT HOME:  TN taught Symptoms and Problems for CONVID-19 home care with pt and with teach back:  1. Wear mask, wash hands with soap and water for 20 seconds, 2.SOB, Chest pain. Med surg asked to place  educa    TN taught patient about things she is responsible for when discharged TO HELP WITH HER RECOVERY:  tion sheet in [facility] Packet.      How to Manage Her Care At Home:  1. Getting her prescriptions filled.  2. Taking her medications as directed. DO NOT MISS ANY DOSES!  3. Going to her follow-up doctor appointments.       HELP AT HOME TO ASSIST WITH PATIENT'S RECOVERY IS mother, John.  Preference for [facility]    11:25AM TN informed med surg nurse Antonette that patient is cleared for discharge from [facility].     07/29/20 1113   Final Note   Assessment Type Final Discharge Note   Anticipated Discharge Disposition Home   What phone number can be called within the next 1-3 days to see how you are doing after discharge?   (see chart)   Hospital Follow Up  Appt(s) scheduled? Yes   Right Care Referral Info   Post Acute Recommendation No Care   Referral Type no care   Post-Acute Status   Discharge Delays None known at this time      07/29/20 1113   Final Note   Assessment Type Final Discharge Note   Anticipated Discharge Disposition Home   What phone number can be called within the next 1-3 days to see how you are doing after discharge?   (see chart)   Hospital Follow Up  Appt(s) scheduled? Yes   Right Care Referral Info   Post Acute Recommendation No Care   Referral Type no care   Post-Acute Status   Discharge Delays None known at this time   .Christie Moser, RN, BSN, STN CCM  7/29/2020

## 2020-07-29 NOTE — PROGRESS NOTES
PIV d/c'd with tip intact. 2X2 and tape applied. No active bleeding noted. Pt given discharge follow up and prescription information. All questions addressed. Pt verbalized understanding. Pt declined wheelchair and stated she will ambulate off unit. Currently getting dressed and waiting on ride.

## 2020-07-29 NOTE — DISCHARGE SUMMARY
[facility] Hospital Medicine  Discharge Summary      Patient Name: Lubna Ty  MRN: [id number]  Admission Date: 7/26/2020  Hospital Length of Stay: 3 days  Discharge Date and Time:  07/29/2020 10:12 AM  Attending Physician: Ady Loyola MD   Discharging Provider: Ady Loyola MD  Primary Care Provider: Primary Doctor No      HPI:   This is 29 year old with sickle cell trait and thrombocytopenia who presents to the hospital with recent diagnosed with pneumonia and hemoptysis. Patient reports she has been having bad allergy symptoms since last week with bad congestion and productive cough. She states she woke up yesterday morning feeling unwell and had a fever of 102. She went to an urgent care where she was diagnosed with pneumonia, she was prescribed azithromycin.  She states after returning home she took her first dose of azithromycin and started having cough with blood in the sputum. She states she also has right-sided sharp chest pain with radiating to her upper right back, which worse with coughing and deep breathing. Chest pain improved with Tylenol and Motrin at home. She denies any abdominal pain, headache, loss of taste, N/V/D, dysuria. She denies previous history of hemoptysis, denies previous history of incarceration or residing in a homeless shelter or previous diagnosis of tuberculosis.  Patient also reports she was tested for COVID earlier today and states the result was negative, however, she has been getting emails from work about multiple people who have tested positive recently. She has Mirena IUD.    In ED, COVID negative. Chest xray with right lower lobe pneumonia.  Leukocytosis 15. Elevated D-Dimer. Elevated aPTT. Lactate negative. CTA chest without no PE.    Patient is admitted for sepsis/pneumonia        * No surgery found *      Hospital Course:   Patient presented with cough to the ED and was found to have pneumonia. Covid negative.  Patient grew out Strep  Pneumonia on blood cultures from 7/26.  Repeat blood cultures were sent on 7/28.   Patient had an uncomplicated clinical course. On the day of discharge, she was requesting to go home. She looked great. No 02 requirements. Repeat blood cultures were NG.  She will be sent home on 2 weeks of Clindamycin. Activity as tolerated. Diet- regular. Follow up with a PCP in one week. Elevated WBC count likely from steroids.      Consults:     No new Assessment & Plan notes have been filed under this hospital service since the last note was generated.  Service: Hospital Medicine    Final Active Diagnoses:    Diagnosis Date Noted POA    Obesity [E66.9] 07/28/2020 Yes    Pneumonia of right lower lobe due to infectious organism [J18.1] 07/26/2020 Yes    Thrombocytopenia [D69.6] 07/26/2020 Yes      Problems Resolved During this Admission:    Diagnosis Date Noted Date Resolved POA    PRINCIPAL PROBLEM:  Sepsis [A41.9] 07/26/2020 07/29/2020 Yes    Acute respiratory failure with hypoxia [J96.01] 07/27/2020 07/29/2020 Yes    Positive D dimer [R79.89] 07/26/2020 07/29/2020 Yes       Discharged Condition: good    Disposition: Home or Self Care    Follow Up:  Follow-up Information     Primary Doctor No In 1 week.               Patient Instructions:   No discharge procedures on file.    Significant Diagnostic Studies:     Pending Diagnostic Studies:     None         Medications:  Reconciled Home Medications:      Medication List      START taking these medications    clindamycin 300 MG capsule  Commonly known as: CLEOCIN  Take 1 capsule (300 mg total) by mouth every 6 (six) hours. for 12 days        CONTINUE taking these medications    meloxicam 7.5 MG tablet  Commonly known as: MOBIC  Take 1 tablet (7.5 mg total) by mouth once daily.     prenatal vit-iron fum-folic ac 27-1 mg Tab  Take 1 tablet by mouth once daily.            Indwelling Lines/Drains at time of discharge:   Lines/Drains/Airways     None                 Time spent on  the discharge of patient:  < 30  minutes  Patient was seen and examined on the date of discharge and determined to be suitable for discharge.         Ady Zepeda MD  Department of Hospital Medicine  [facility]

## 2020-07-29 NOTE — PROGRESS NOTES
[facility] Hospital Medicine  Progress Note    Patient Name: Lubna Ty  MRN: [id number]  Patient Class: IP- Inpatient   Admission Date: 7/26/2020  Length of Stay: 3 days  Attending Physician: Ady Loyola MD  Primary Care Provider: Primary Doctor No        Subjective:     Principal Problem:Sepsis        HPI:  This is 29 year old with sickle cell trait and thrombocytopenia who presents to the hospital with recent diagnosed with pneumonia and hemoptysis. Patient reports she has been having bad allergy symptoms since last week with bad congestion and productive cough. She states she woke up yesterday morning feeling unwell and had a fever of 102. She went to an urgent care where she was diagnosed with pneumonia, she was prescribed azithromycin.  She states after returning home she took her first dose of azithromycin and started having cough with blood in the sputum. She states she also has right-sided sharp chest pain with radiating to her upper right back, which worse with coughing and deep breathing. Chest pain improved with Tylenol and Motrin at home. She denies any abdominal pain, headache, loss of taste, N/V/D, dysuria. She denies previous history of hemoptysis, denies previous history of incarceration or residing in a homeless shelter or previous diagnosis of tuberculosis.  Patient also reports she was tested for COVID earlier today and states the result was negative, however, she has been getting emails from work about multiple people who have tested positive recently. She has Mirena IUD.    In ED, COVID negative. Chest xray with right lower lobe pneumonia.  Leukocytosis 15. Elevated D-Dimer. Elevated aPTT. Lactate negative. CTA chest without no PE.    Patient is admitted for sepsis/pneumonia        Overview/Hospital Course:  Patient presented with cough to the ED and was found to have pneumonia. Covid negative.  Patient grew out Strep Pneumonia on blood cultures from 7/26.  Repeat  blood cultures were sent on 7/28.   Patient had an uncomplicated clinical course. On the day of discharge, she was requesting to go home. She looked great. No 02 requirements. Repeat blood cultures were NG.  She will be sent home on 2 weeks of Clindamycin. Activity as tolerated. Diet- regular. Follow up with a PCP in one week.     Interval History: doing great. Would like to go home.       Review of Systems   Constitutional: Negative for activity change.   HENT: Negative for congestion.    Respiratory: Positive for cough. Negative for chest tightness and shortness of breath.    Cardiovascular: Negative for chest pain.   Gastrointestinal: Negative for abdominal pain.   Genitourinary: Negative for difficulty urinating and dyspareunia.   Musculoskeletal: Negative for arthralgias.   Skin: Negative for color change.   Psychiatric/Behavioral: Negative for agitation and behavioral problems.     Objective:     Vital Signs (Most Recent):  Temp: 98 °F (36.7 °C) (07/29/20 0715)  Pulse: 76 (07/29/20 0715)  Resp: 18 (07/29/20 0715)  BP: 124/89 (07/29/20 0715)  SpO2: (!) 94 % (07/29/20 0715) Vital Signs (24h Range):  Temp:  [97.8 °F (36.6 °C)-98.6 °F (37 °C)] 98 °F (36.7 °C)  Pulse:  [60-88] 76  Resp:  [17-20] 18  SpO2:  [92 %-95 %] 94 %  BP: (122-131)/(58-89) 124/89     Weight: 121.6 kg (268 lb 1.3 oz)  Body mass index is 39.59 kg/m².    Intake/Output Summary (Last 24 hours) at 7/29/2020 1008  Last data filed at 7/28/2020 1730  Gross per 24 hour   Intake 480 ml   Output --   Net 480 ml      Physical Exam  Vitals signs and nursing note reviewed.   Constitutional:       General: She is not in acute distress.     Appearance: Normal appearance. She is obese. She is not ill-appearing, toxic-appearing or diaphoretic.   Pulmonary:      Effort: No respiratory distress.   Neurological:      Mental Status: She is alert and oriented to person, place, and time.   Psychiatric:         Mood and Affect: Mood normal.         Behavior: Behavior  normal.         Significant Labs:   BMP:   Recent Labs   Lab 07/29/20  0534   GLU 69*      K 3.9      CO2 24   BUN 10   CREATININE 0.8   CALCIUM 8.9   MG 1.8     CBC:   Recent Labs   Lab 07/28/20  0412 07/29/20  0534   WBC 17.65* 17.63*   HGB 11.6* 11.5*   HCT 35.1* 34.2*    219       Significant Imaging:       Assessment/Plan:      * Sepsis  As above, monitor sepsis physiology    Sepsis has resolved.    Obesity  Body mass index is 39.59 kg/m².  Weight loss as out patient.       Acute respiratory failure with hypoxia  Improved with O2, due to multilobar pneumonia, monitor and wean O2 as able    Resolved       Positive D dimer  D-Dimer elevated 1.36 with hypoxic. CTA chest no PE.      Thrombocytopenia  Chronic with baseline from 119-139. Stable, monitor      Pneumonia of right lower lobe due to infectious organism  Has fever 102, hemoptysis, hypoxic. Chest xray showed right lower lobe pneumonia. Leukocytosis WBC 15. Oxygen sats drop to 89% on room air. Right-sided chest pain worse with cough and deep breathing. Per patient, COVID negative in an urgent care, and again negative in ED. Was diagnosed with pneumonia and given Azithromycin. Oxygen sats now 98% on 3L NC    - Add Rocephin to Azithromycin coverage  - BC and sputum culture pending, COVID pending  - supplemental O2 to maintain sats > 92    No risk factors for MRSA or Pseudomonas    Strep pneumo on blood cultures. Will d/c Azithromycin .   Check an HIV- negative.     Repeat blood cultures NG. Aware of WBC count at 17 but patient looks great and would like to go home.   Will treat with 2 weeks of Clindamycin.              VTE Risk Mitigation (From admission, onward)         Ordered     enoxaparin injection 40 mg  Every 24 hours      07/26/20 1622     Place ITA hose  Until discontinued      07/26/20 0751     IP VTE HIGH RISK PATIENT  Once      07/26/20 0609     Place sequential compression device  Until discontinued      07/26/20 0609                 Will d/c to home.         Ady Zepeda MD  Department of Hospital Medicine   [facility]

## 2020-07-30 ENCOUNTER — PATIENT OUTREACH (OUTPATIENT)
Dept: ADMINISTRATIVE | Facility: CLINIC | Age: 30
End: 2020-07-30

## 2020-07-30 LAB
BACTERIA SPEC AEROBE CULT: ABNORMAL
GRAM STN SPEC: ABNORMAL

## 2020-07-30 NOTE — PATIENT INSTRUCTIONS
Pneumonia (Adult)  Pneumonia is an infection deep within the lungs. It is in the small air sacs (alveoli). Pneumonia may be caused by a virus or bacteria. Pneumonia caused by bacteria is usually treated with an antibiotic. Severe cases may need to be treated in the hospital. Milder cases can be treated at home. Symptoms usually start to get better during the first 2 days of treatment.    Home care  Follow these guidelines when caring for yourself at home:  · Rest at home for the first 2 to 3 days, or until you feel stronger. Dont let yourself get overly tired when you go back to your activities.  · Stay away from cigarette smoke - yours or other peoples.  · You may use acetaminophen or ibuprofen to control fever or pain, unless another medicine was prescribed. If you have chronic liver or kidney disease, talk with your healthcare provider before using these medicines. Also talk with your provider if youve had a stomach ulcer or gastrointestinal bleeding. Dont give aspirin to anyone younger than 18 years of age who is ill with a fever. It may cause severe liver damage.  · Your appetite may be poor, so a light diet is fine.  · Drink 6 to 8 glasses of fluids every day to make sure you are getting enough fluids. Beverages can include water, sport drinks, sodas without caffeine, juices, tea, or soup. Fluids will help loosen secretions in the lung. This will make it easier for you to cough up the phlegm (sputum). If you also have heart or kidney disease, check with your healthcare provider before you drink extra fluids.  · Take antibiotic medicine prescribed until it is all gone, even if you are feeling better after a few days.  Follow-up care  Follow up with your healthcare provider in the next 2 to 3 days, or as advised. This is to be sure the medicine is helping you get better.  If you are 65 or older, you should get a pneumococcal vaccine and a yearly flu (influenza) shot. You should also get these vaccines if  you have chronic lung disease like asthma, emphysema, or COPD. Recently, a second type of pneumonia vaccine has become available for everyone over 65 years old. This is in addition to the previous vaccine. Ask your provider about this.  When to seek medical advice  Call your healthcare provider right away if any of these occur:  · You dont get better within the first 48 hours of treatment  · Shortness of breath gets worse  · Rapid breathing (more than 25 breaths per minute)  · Coughing up blood  · Chest pain gets worse with breathing  · Fever of 100.4°F (38°C) or higher that doesnt get better with fever medicine  · Weakness, dizziness, or fainting that gets worse  · Thirst or dry mouth that gets worse  · Sinus pain, headache, or a stiff neck  · Chest pain not caused by coughing  Date Last Reviewed: 1/1/2017  © 3500-4818 [employer]. [address] [place] [address]. All rights reserved. This information is not intended as a substitute for professional medical care. Always follow your healthcare professional's instructions.

## 2020-07-30 NOTE — PROGRESS NOTES
C3 nurse spoke with Lubna Ty for a TCC post hospital discharge follow up call. The patient does not have a scheduled HOSFU appointment with pcp within 7-14 days post hospital discharge date 7/29. C3 nurse was unable to schedule HOSFU appointment in Epic d/t patient not having pcp.  Patient states she was set up with a new PCP on discharge and has HOSFU on 8/7 @ 1040 but does not remember the name.

## 2020-07-31 NOTE — PHYSICIAN QUERY
PT Name: Lubna Ty  MR #: [id number]     Documentation Clarification      CDS/: JANUARY Henriquez, RN, CDS               Contact information:[email or web address]     This form is a permanent document in the medical record.     Query Date: July 31, 2020    By submitting this query, we are merely seeking further clarification of documentation. Please utilize your independent clinical judgment when addressing the question(s) below.    The Medical Record reflects the following:    Clinical Findings Location in Medical Record    Given her hypoxia, pneumonia, I plan to admit this patient to Hospital Medicine     SpO2 93% RR 20     No respiratory distress.    Diminished breath sounds in the right base.     Patient has an emesis bag with pink colored sputum    Positive for cough     Her O2 sats 93% on room air       Pt ambulatory pulse ox 91% on RA        Sepsis, source of pneumonia     Pneumonia- fever 102, hemoptysis, hypoxic     Oxygen sats drop to 89% on room air. Right-sided chest pain worse with cough and deep breathing.      Oxygen sats now 98% on 3L NC     Positive for cough (blood tinged sputum), chest tightness and shortness of breath     Breath sounds: Rales (coarse R>L) present.    Comments: Tachypnea     Vital Signs (24h Range):   Resp:  [20-33] 20   SpO2:  [89 %-98 %] 98 %         Acute respiratory failure with hypoxia   Improved with O2, due to multilobar pneumonia, monitor and wean O2 as able     Breathing has improved, still with some cough and right sided pleuritic chest pain     Effort: Pulmonary effort is normal.    Breath sounds: Rhonchi and rales present. No wheezing.      Vital Signs (24h Range):   Resp:  [17-20] 17   SpO2:  [91 %-98 %] 95 %       States she feels better. No SOB      Vital Signs (24h Range):   Resp:  [16-20] 17   SpO2:  [92 %-95 %] 93 %     No respiratory distress     Patient had an uncomplicated clinical course. On the day of discharge, she was requesting to go home. She  looked great. No 02 requirements    ED, Dr. Henriquez, 7/26                          Nurse note, 7/26 at 2:16 AM       H&P, KELSEA Post, NP/Dr. Mojica, 7/26                                         , Dr. Maldonado, 7/27                           , Dr. Loyola, 7/28                  [facility], Dr. Loyola, 7/29                                                                            Provider, please clarify the diagnosis of Acute respiratory failure with hypoxia:      [  x ] Acute respiratory failure with hypoxia  ruled in and additional clinical support/decision-making indicators for the diagnosis include (please specify):__________________________     [   ] Acute respiratory failure with hypoxia has been ruled out     [   ] Acute respiratory failure with hypoxia has been ruled out, other diagnosis ruled in:     [  ] Hypoxia     [  ] Other, please specify:_______________     [   ] Other clarification (please specify): ___________________     [  ] Clinically undetermined         Present on admission (POA) status:   [   ] Yes (Y)                          [  ] Clinically Undetermined (W)  [   ] No (N)                            [   ] Documentation insufficient to determine if condition is POA (U)

## 2020-08-02 LAB
BACTERIA BLD CULT: NORMAL
BACTERIA BLD CULT: NORMAL

## 2020-08-28 DIAGNOSIS — J18.9 PNEUMONIA DUE TO INFECTIOUS ORGANISM, UNSPECIFIED LATERALITY, UNSPECIFIED PART OF LUNG: Primary | ICD-10-CM

## 2020-09-11 ENCOUNTER — HOSPITAL ENCOUNTER (OUTPATIENT)
Dept: RADIOLOGY | Facility: HOSPITAL | Age: 30
Discharge: HOME | End: 2020-09-11
Attending: NURSE PRACTITIONER
Payer: COMMERCIAL

## 2020-09-11 DIAGNOSIS — J18.9 PNEUMONIA DUE TO INFECTIOUS ORGANISM, UNSPECIFIED LATERALITY, UNSPECIFIED PART OF LUNG: ICD-10-CM

## 2020-09-11 PROCEDURE — 71046 X-RAY EXAM CHEST 2 VIEWS: CPT | Mod: 26,,, | Performed by: RADIOLOGY

## 2020-09-11 PROCEDURE — 71046 X-RAY EXAM CHEST 2 VIEWS: CPT | Mod: TC,FY

## 2020-09-11 PROCEDURE — 71046 XR CHEST PA AND LATERAL: ICD-10-PCS | Mod: 26,,, | Performed by: RADIOLOGY

## 2021-04-26 ENCOUNTER — PATIENT MESSAGE (OUTPATIENT)
Dept: RESEARCH | Facility: HOSPITAL | Age: 31
End: 2021-04-26

## 2025-04-20 ENCOUNTER — HOSPITAL ENCOUNTER (EMERGENCY)
Facility: HOSPITAL | Age: 35
Discharge: HOME | End: 2025-04-20
Attending: STUDENT IN AN ORGANIZED HEALTH CARE EDUCATION/TRAINING PROGRAM
Payer: COMMERCIAL

## 2025-04-20 VITALS
TEMPERATURE: 100 F | WEIGHT: 236 LBS | BODY MASS INDEX: 35.77 KG/M2 | HEART RATE: 95 BPM | RESPIRATION RATE: 18 BRPM | HEIGHT: 68 IN | OXYGEN SATURATION: 96 % | SYSTOLIC BLOOD PRESSURE: 110 MMHG | DIASTOLIC BLOOD PRESSURE: 59 MMHG

## 2025-04-20 DIAGNOSIS — N12 PYELONEPHRITIS: ICD-10-CM

## 2025-04-20 DIAGNOSIS — I49.9 CARDIAC ARRHYTHMIA, UNSPECIFIED CARDIAC ARRHYTHMIA TYPE: ICD-10-CM

## 2025-04-20 DIAGNOSIS — R00.2 PALPITATIONS: Primary | ICD-10-CM

## 2025-04-20 DIAGNOSIS — A41.9 SEPSIS, DUE TO UNSPECIFIED ORGANISM, UNSPECIFIED WHETHER ACUTE ORGAN DYSFUNCTION PRESENT: ICD-10-CM

## 2025-04-20 DIAGNOSIS — R10.12 LEFT UPPER QUADRANT ABDOMINAL PAIN: ICD-10-CM

## 2025-04-20 LAB
ABSOLUTE EOSINOPHIL (OHS): 0.26 K/UL
ABSOLUTE MONOCYTE (OHS): 0.84 K/UL (ref 0.3–1)
ABSOLUTE NEUTROPHIL COUNT (OHS): 11.45 K/UL (ref 1.8–7.7)
ALBUMIN SERPL BCP-MCNC: 3.4 G/DL (ref 3.5–5.2)
ALP SERPL-CCNC: 59 UNIT/L (ref 40–150)
ALT SERPL W/O P-5'-P-CCNC: 18 UNIT/L (ref 10–44)
ANION GAP (OHS): 10 MMOL/L (ref 8–16)
AST SERPL-CCNC: 13 UNIT/L (ref 11–45)
BACTERIA #/AREA URNS AUTO: ABNORMAL /HPF
BASOPHILS # BLD AUTO: 0.03 K/UL
BASOPHILS NFR BLD AUTO: 0.2 %
BILIRUB SERPL-MCNC: 0.6 MG/DL (ref 0.1–1)
BILIRUB UR QL STRIP.AUTO: NEGATIVE
BNP SERPL-MCNC: 40 PG/ML (ref 0–99)
BUN SERPL-MCNC: 13 MG/DL (ref 6–20)
CALCIUM SERPL-MCNC: 8.7 MG/DL (ref 8.7–10.5)
CHLORIDE SERPL-SCNC: 112 MMOL/L (ref 95–110)
CLARITY UR: ABNORMAL
CO2 SERPL-SCNC: 17 MMOL/L (ref 23–29)
COLOR UR AUTO: ABNORMAL
CREAT SERPL-MCNC: 0.8 MG/DL (ref 0.5–1.4)
D DIMER PPP IA.FEU-MCNC: 0.9 MG/L FEU
ERYTHROCYTE [DISTWIDTH] IN BLOOD BY AUTOMATED COUNT: 13.6 % (ref 11.5–14.5)
GFR SERPLBLD CREATININE-BSD FMLA CKD-EPI: >60 ML/MIN/1.73/M2
GLUCOSE SERPL-MCNC: 109 MG/DL (ref 70–110)
GLUCOSE UR QL STRIP: NEGATIVE
HCT VFR BLD AUTO: 40.2 % (ref 37–48.5)
HGB BLD-MCNC: 13.8 GM/DL (ref 12–16)
HGB UR QL STRIP: ABNORMAL
HOLD SPECIMEN: NORMAL
HYALINE CASTS UR QL AUTO: 0 /LPF (ref 0–1)
IMM GRANULOCYTES # BLD AUTO: 0.03 K/UL (ref 0–0.04)
IMM GRANULOCYTES NFR BLD AUTO: 0.2 % (ref 0–0.5)
KETONES UR QL STRIP: NEGATIVE
LEUKOCYTE ESTERASE UR QL STRIP: ABNORMAL
LIPASE SERPL-CCNC: 25 U/L (ref 4–60)
LYMPHOCYTES # BLD AUTO: 1.88 K/UL (ref 1–4.8)
MAGNESIUM SERPL-MCNC: 1.7 MG/DL (ref 1.6–2.6)
MCH RBC QN AUTO: 29.5 PG (ref 27–31)
MCHC RBC AUTO-ENTMCNC: 34.3 G/DL (ref 32–36)
MCV RBC AUTO: 86 FL (ref 82–98)
MICROSCOPIC COMMENT: ABNORMAL
NITRITE UR QL STRIP: POSITIVE
NUCLEATED RBC (/100WBC) (OHS): 0 /100 WBC
PH UR STRIP: 6 [PH]
PLATELET # BLD AUTO: 168 K/UL (ref 150–450)
PMV BLD AUTO: 11.4 FL (ref 9.2–12.9)
POTASSIUM SERPL-SCNC: 3.8 MMOL/L (ref 3.5–5.1)
PROT SERPL-MCNC: 6.9 GM/DL (ref 6–8.4)
PROT UR QL STRIP: ABNORMAL
RBC # BLD AUTO: 4.68 M/UL (ref 4–5.4)
RBC #/AREA URNS AUTO: 55 /HPF (ref 0–4)
RELATIVE EOSINOPHIL (OHS): 1.8 %
RELATIVE LYMPHOCYTE (OHS): 13 % (ref 18–48)
RELATIVE MONOCYTE (OHS): 5.8 % (ref 4–15)
RELATIVE NEUTROPHIL (OHS): 79 % (ref 38–73)
SODIUM SERPL-SCNC: 139 MMOL/L (ref 136–145)
SP GR UR STRIP: 1.01
SQUAMOUS #/AREA URNS AUTO: 1 /HPF
TROPONIN I SERPL DL<=0.01 NG/ML-MCNC: <0.006 NG/ML
TSH SERPL-ACNC: 0.67 UIU/ML (ref 0.4–4)
UROBILINOGEN UR STRIP-ACNC: NEGATIVE EU/DL
WBC # BLD AUTO: 14.49 K/UL (ref 3.9–12.7)
WBC #/AREA URNS AUTO: >100 /HPF (ref 0–5)
WBC CLUMPS UR QL AUTO: ABNORMAL

## 2025-04-20 PROCEDURE — 83690 ASSAY OF LIPASE: CPT | Performed by: PHYSICIAN ASSISTANT

## 2025-04-20 PROCEDURE — 84443 ASSAY THYROID STIM HORMONE: CPT | Performed by: PHYSICIAN ASSISTANT

## 2025-04-20 PROCEDURE — 99285 EMERGENCY DEPT VISIT HI MDM: CPT | Mod: 25

## 2025-04-20 PROCEDURE — 25000003 PHARM REV CODE 250: Performed by: PHYSICIAN ASSISTANT

## 2025-04-20 PROCEDURE — 85025 COMPLETE CBC W/AUTO DIFF WBC: CPT | Performed by: PHYSICIAN ASSISTANT

## 2025-04-20 PROCEDURE — 83880 ASSAY OF NATRIURETIC PEPTIDE: CPT | Performed by: PHYSICIAN ASSISTANT

## 2025-04-20 PROCEDURE — 81003 URINALYSIS AUTO W/O SCOPE: CPT | Performed by: PHYSICIAN ASSISTANT

## 2025-04-20 PROCEDURE — 87186 SC STD MICRODIL/AGAR DIL: CPT | Performed by: PHYSICIAN ASSISTANT

## 2025-04-20 PROCEDURE — 96375 TX/PRO/DX INJ NEW DRUG ADDON: CPT

## 2025-04-20 PROCEDURE — 80053 COMPREHEN METABOLIC PANEL: CPT | Performed by: PHYSICIAN ASSISTANT

## 2025-04-20 PROCEDURE — 96374 THER/PROPH/DIAG INJ IV PUSH: CPT

## 2025-04-20 PROCEDURE — 85379 FIBRIN DEGRADATION QUANT: CPT | Performed by: PHYSICIAN ASSISTANT

## 2025-04-20 PROCEDURE — 84484 ASSAY OF TROPONIN QUANT: CPT | Performed by: PHYSICIAN ASSISTANT

## 2025-04-20 PROCEDURE — 25500020 PHARM REV CODE 255: Performed by: STUDENT IN AN ORGANIZED HEALTH CARE EDUCATION/TRAINING PROGRAM

## 2025-04-20 PROCEDURE — 63600175 PHARM REV CODE 636 W HCPCS: Mod: JZ,TB | Performed by: PHYSICIAN ASSISTANT

## 2025-04-20 PROCEDURE — 83735 ASSAY OF MAGNESIUM: CPT | Performed by: PHYSICIAN ASSISTANT

## 2025-04-20 RX ORDER — ONDANSETRON 4 MG/1
4 TABLET, ORALLY DISINTEGRATING ORAL EVERY 6 HOURS PRN
Qty: 12 TABLET | Refills: 0 | Status: SHIPPED | OUTPATIENT
Start: 2025-04-20 | End: 2025-04-20

## 2025-04-20 RX ORDER — CEFTRIAXONE 1 G/1
1 INJECTION, POWDER, FOR SOLUTION INTRAMUSCULAR; INTRAVENOUS
Status: COMPLETED | OUTPATIENT
Start: 2025-04-20 | End: 2025-04-20

## 2025-04-20 RX ORDER — KETOROLAC TROMETHAMINE 10 MG/1
10 TABLET, FILM COATED ORAL 4 TIMES DAILY PRN
Qty: 20 TABLET | Refills: 0 | Status: SHIPPED | OUTPATIENT
Start: 2025-04-20 | End: 2025-04-20

## 2025-04-20 RX ORDER — FAMOTIDINE 20 MG/1
20 TABLET, FILM COATED ORAL 2 TIMES DAILY
Qty: 20 TABLET | Refills: 0 | Status: SHIPPED | OUTPATIENT
Start: 2025-04-20 | End: 2025-04-30

## 2025-04-20 RX ORDER — KETOROLAC TROMETHAMINE 30 MG/ML
15 INJECTION, SOLUTION INTRAMUSCULAR; INTRAVENOUS
Status: COMPLETED | OUTPATIENT
Start: 2025-04-20 | End: 2025-04-20

## 2025-04-20 RX ORDER — LEVOFLOXACIN 750 MG/1
750 TABLET, FILM COATED ORAL DAILY
Qty: 7 TABLET | Refills: 0 | Status: SHIPPED | OUTPATIENT
Start: 2025-04-20 | End: 2025-04-27

## 2025-04-20 RX ORDER — ACETAMINOPHEN 500 MG
1000 TABLET ORAL
Status: COMPLETED | OUTPATIENT
Start: 2025-04-20 | End: 2025-04-20

## 2025-04-20 RX ORDER — LEVOFLOXACIN 750 MG/1
750 TABLET, FILM COATED ORAL DAILY
Qty: 5 TABLET | Refills: 0 | Status: SHIPPED | OUTPATIENT
Start: 2025-04-20 | End: 2025-04-20

## 2025-04-20 RX ORDER — FAMOTIDINE 20 MG/1
20 TABLET, FILM COATED ORAL 2 TIMES DAILY
Qty: 20 TABLET | Refills: 0 | Status: SHIPPED | OUTPATIENT
Start: 2025-04-20 | End: 2025-04-20

## 2025-04-20 RX ORDER — KETOROLAC TROMETHAMINE 10 MG/1
10 TABLET, FILM COATED ORAL 4 TIMES DAILY PRN
Qty: 20 TABLET | Refills: 0 | Status: SHIPPED | OUTPATIENT
Start: 2025-04-20 | End: 2025-04-25

## 2025-04-20 RX ORDER — ONDANSETRON 4 MG/1
4 TABLET, ORALLY DISINTEGRATING ORAL EVERY 6 HOURS PRN
Qty: 12 TABLET | Refills: 0 | Status: SHIPPED | OUTPATIENT
Start: 2025-04-20

## 2025-04-20 RX ADMIN — CEFTRIAXONE 1 G: 1 INJECTION, POWDER, FOR SOLUTION INTRAMUSCULAR; INTRAVENOUS at 02:04

## 2025-04-20 RX ADMIN — KETOROLAC TROMETHAMINE 15 MG: 30 INJECTION, SOLUTION INTRAMUSCULAR; INTRAVENOUS at 05:04

## 2025-04-20 RX ADMIN — IOHEXOL 80 ML: 350 INJECTION, SOLUTION INTRAVENOUS at 03:04

## 2025-04-20 RX ADMIN — ACETAMINOPHEN 1000 MG: 500 TABLET ORAL at 05:04

## 2025-04-20 NOTE — ED NOTES
Pt arrive to ED via EMS c/o abd and back pain x2 days, also reports she noticed dark urine this morning. Denies GI hx or taking any medications PTA.

## 2025-04-20 NOTE — DISCHARGE INSTRUCTIONS
Take the oral antibiotics as written, try to take with meals to limit nausea; take entire course of antibiotics.  Zofran as needed for nausea.  Make sure you are drinking plenty of fluids.  Toradol as needed for discomfort; take Pepcid twice daily while taking the Toradol to prevent stomach irritation.    Please follow up with a primary care provider for repeat exam and re-evaluation, to ensure resolution of symptoms.  Establish care with a local cardiologist for evaluation of reported SVT/cardiac arrhythmia.     Please return to this ED if you begin with fever or chills, if worsening pain despite current plan, if unable to eat or drink, if you begin with vomiting, if unable to take antibiotics, if you begin with persistent racing heartbeat, if you feel lightheaded or feel as if you are going to pass out, if you develop chest pain or shortness of breath, if any other problems occur.

## 2025-04-20 NOTE — ED PROVIDER NOTES
Encounter Date: 4/20/2025       History     Chief Complaint   Patient presents with    Abdominal Pain     Pt arrive to ED c/o abd pain x2 days, was seen at [facility] and sent here for tachycardia.      34-year-old female presents to ED via transfer from [facility] ED complaining of 3 day history of intermittent left upper quadrant abdominal pain.    Intermittent pain, woke her from sleep Thursday evening; again woke her from sleep tonight prompting visit to [facility] ED. she states upon arrival to ED she was experiencing mild fluttering, palpitations.  No associated shortness of breath, chest pain, syncope or near-syncope.  Per their ED staff, she was in SVT and was given adenosine.  She was then transferred to the ED for re-evaluation.  No personal history of ACS.  Denies thyroid issues.  Denies history of AFib or any other arrhythmias.  Denies history of SVT.  No recurrence of palpitations or fluttering sensation since being given medication at the prior ED.    Concerning her abdominal pain: She does admit to occasional nausea related to pain.  No emesis.  She denies any change in appetite or intake.  Pain is sharp, moderate to severe in nature, involves her left-sided thoracic back. No radiation of pain from the back to her abdomen.  No history of nephrolithiasis.  Does admit to increased urinary frequency.  Denies frequent UTIs.  Admits to mild, chronic constipation not acutely worsened.  Does not feel constipated at this time.  No recent diarrhea.  No recent melena or hematochezia.  Denies history of pancreatitis.  No excessive ETOH use. Denies regular or recent NSAID use.  Denies history of GERD.  Denies history of any gallbladder issues.  No overlying skin changes.  Denies any new or worsening cough.  No shortness of breath or chest pain.  States there is chronic wheeze, but denies any worsening of her asthma.  She denies fever, chills, myalgias.  Does admit to worsening of abdominal pain with deep  inspiration.  No other exacerbating or alleviating factors.  No radiation of symptoms.  No exogenous estrogen.  No leg swelling or calf pain.  No history of VTE.    No new medications    No abdominal surgeries    Mirena IUD in place    PMH:  Sickle cell trait  Gestational thrombocytopenia  Asthma  Anemia      Review of patient's allergies indicates:   Allergen Reactions    No known drug allergies      Past Medical History:   Diagnosis Date    Abnormal Pap smear     ASCUS + HR HPV    Anemia     Chlamydia     treated for chlamydia about 6 months ago    Sickle cell trait     Thrombocytopenia     in pregnancy     History reviewed. No pertinent surgical history.  Family History   Problem Relation Name Age of Onset    Colon cancer Maternal Grandmother Great     Breast cancer Neg Hx      Ovarian cancer Neg Hx       Social History[1]  Review of Systems   Constitutional:  Negative for appetite change, chills and fever.   Eyes:  Negative for discharge and redness.   Respiratory:  Positive for wheezing (Chronic). Negative for cough and shortness of breath.    Cardiovascular:  Positive for palpitations. Negative for chest pain and leg swelling.   Gastrointestinal:  Positive for abdominal pain and nausea. Negative for constipation, diarrhea and vomiting.   Genitourinary:  Positive for frequency. Negative for dysuria, flank pain and vaginal discharge.   Musculoskeletal:  Positive for back pain. Negative for myalgias, neck pain and neck stiffness.   Skin:  Negative for rash.   Neurological:  Negative for syncope.       Physical Exam     Initial Vitals [04/20/25 0112]   BP Pulse Resp Temp SpO2   119/83 108 16 98.5 °F (36.9 °C) 98 %      MAP       --         Physical Exam    Nursing note and vitals reviewed.  Constitutional: She appears well-developed and well-nourished. She is not diaphoretic. No distress.   Overall well-appearing nontoxic.  Speaking in full sentences without pause or difficulty.   HENT:   Head: Normocephalic and  atraumatic.   Neck: Neck supple.   Normal range of motion.  Cardiovascular:  Intact distal pulses.           2+ DP bilaterally.  No unilateral leg swelling or calf tenderness, no pretibial edema.   Pulmonary/Chest: No respiratory distress.   Very faint inspiratory wheeze to bilateral lower lung zones.   Abdominal:   Abdomen overall soft, normal bowel sounds ×4.  Very mild TTP left upper quadrant. No rebound or guarding.  Questionable left upper quadrant mass.  No flank or CVA tenderness.  Negative Conti sign.  No pain over McBurney's point.   Musculoskeletal:         General: No tenderness. Normal range of motion.      Cervical back: Normal range of motion and neck supple.     Neurological: She is alert and oriented to person, place, and time. GCS score is 15. GCS eye subscore is 4. GCS verbal subscore is 5. GCS motor subscore is 6.   Skin: Skin is warm.   Psychiatric: She has a normal mood and affect. Thought content normal.         ED Course   Critical Care    Date/Time: 4/20/2025 5:46 AM    Performed by: Balaji Sharma, PA-C  Authorized by: Mikie Snider MD  Direct patient critical care time: 30 minutes  Additional history critical care time: 20 minutes  Ordering / reviewing critical care time: 30 minutes  Documentation critical care time: 30 minutes  Consult with family critical care time: 10 minutes  Total critical care time (exclusive of procedural time) : 120 minutes  Critical care was necessary to treat or prevent imminent or life-threatening deterioration of the following conditions: sepsis.  Critical care was time spent personally by me on the following activities: development of treatment plan with patient or surrogate, interpretation of cardiac output measurements, evaluation of patient's response to treatment, examination of patient, obtaining history from patient or surrogate, ordering and performing treatments and interventions, ordering and review of laboratory studies, ordering and review  of radiographic studies, re-evaluation of patient's condition, pulse oximetry and review of old charts.        Labs Reviewed   COMPREHENSIVE METABOLIC PANEL - Abnormal       Result Value    Sodium 139      Potassium 3.8      Chloride 112 (*)     CO2 17 (*)     Glucose 109      BUN 13      Creatinine 0.8      Calcium 8.7      Protein Total 6.9      Albumin 3.4 (*)     Bilirubin Total 0.6      ALP 59      AST 13      ALT 18      Anion Gap 10      eGFR >60      Narrative:     Specimen slightly hemolyzed.   URINALYSIS, REFLEX TO URINE CULTURE - Abnormal    Color, UA Orange (*)     Appearance, UA Cloudy (*)     pH, UA 6.0      Spec Grav UA 1.010      Protein, UA 1+ (*)     Glucose, UA Negative      Ketones, UA Negative      Bilirubin, UA Negative      Blood, UA 3+ (*)     Nitrites, UA Positive (*)     Urobilinogen, UA Negative      Leukocyte Esterase, UA 3+ (*)    CBC WITH DIFFERENTIAL - Abnormal    WBC 14.49 (*)     RBC 4.68      HGB 13.8      HCT 40.2      MCV 86      MCH 29.5      MCHC 34.3      RDW 13.6      Platelet Count 168      MPV 11.4      Nucleated RBC 0      Neut % 79.0 (*)     Lymph % 13.0 (*)     Mono % 5.8      Eos % 1.8      Basophil % 0.2      Imm Grans % 0.2      Neut # 11.45 (*)     Lymph # 1.88      Mono # 0.84      Eos # 0.26      Baso # 0.03      Imm Grans # 0.03     D DIMER, QUANTITATIVE - Abnormal    D-Dimer 0.90 (*)    URINALYSIS MICROSCOPIC - Abnormal    RBC, UA 55 (*)     WBC, UA >100 (*)     WBC Clumps, UA Many (*)     Bacteria, UA Moderate (*)     Squamous Epithelial Cells, UA 1      Hyaline Casts, UA 0      Microscopic Comment       LIPASE - Normal    Lipase Level 25     TSH - Normal    TSH 0.674     TROPONIN I - Normal    Troponin-I <0.006     B-TYPE NATRIURETIC PEPTIDE - Normal    BNP 40     MAGNESIUM - Normal    Magnesium  1.7     CULTURE, URINE   CBC W/ AUTO DIFFERENTIAL    Narrative:     The following orders were created for panel order CBC W/ AUTO DIFFERENTIAL.  Procedure                                Abnormality         Status                     ---------                               -----------         ------                     CBC with Differential[[id number]]       Abnormal            Final result                 Please view results for these tests on the individual orders.   GREY TOP URINE HOLD    Extra Tube Hold for add-ons.       EKG Readings: (Independently Interpreted)   Sinus tachycardia, ventricular rate 98 beats per minute.  Normal RI, normal QT, normal QRS duration.  There is right axis deviation.  No convincing ST elevation.  Small Q-waves noted to inferior leads, to lateral precordial leads.  New right axis deviation, nonspecific ST segment changes compared to previous dated 06/26/2020.       Imaging Results              CTA Chest Non-Coronary (PE Studies) (Final result)  Result time 04/20/25 04:35:23      Final result by Antonio Dempsey MD (04/20/25 04:35:23)                   Impression:      Adequate contrast bolus opacification of the pulmonary arteries. No acute central pulmonary thromboembolism to the proximal segmental level.    Mosaic attenuation pattern seen which can be associated with small airway disease.      Electronically signed by: Antonio Dempsey MD  Date:    04/20/2025  Time:    04:35               Narrative:    EXAMINATION:  CTA CHEST NON CORONARY (PE STUDIES)    CLINICAL HISTORY:  Pulmonary embolism (PE) suspected, positive D-dimer;    TECHNIQUE:  Low dose axial images, sagittal and coronal reformations were obtained from the thoracic inlet to the lung bases following the IV administration of 100 mL of Omnipaque 350.  Contrast timing was optimized to evaluate the pulmonary arteries.  MIP images were performed.    COMPARISON:  None    FINDINGS:    Adequate contrast bolus opacification of the pulmonary arteries. No acute central pulmonary thromboembolism to the proximal segmental level.  No hilar or mediastinal mass or lymphadenopathy. No pleural or  pericardial effusion. Lungs are well-expanded and clear with no consolidation, pulmonary nodule or pneumothorax. Mosaic attenuation pattern seen which can be associated with small airway disease.  Limited images through the upper abdomen are unremarkable.                                       X-Ray Chest 1 View (Final result)  Result time 04/20/25 03:13:39      Final result by Antonio Dempsey MD (04/20/25 03:13:39)                   Impression:      No acute findings in the chest.      Electronically signed by: Antonio Dempsey MD  Date:    04/20/2025  Time:    03:13               Narrative:    EXAMINATION:  XR CHEST 1 VIEW    CLINICAL HISTORY:  Palpitations    TECHNIQUE:  Single frontal view of the chest was performed.    COMPARISON:  09/11/2020.    FINDINGS:  No consolidation, pleural effusion or pneumothorax.    Cardiomediastinal silhouette is unremarkable.                                    X-Rays:   Independently Interpreted Readings:   Chest X-Ray: Personal interpretation:  No significant cardiomegaly, no obvious effusion although difficult to confidently visualize left-sided costophrenic angle, no pneumothorax, no convincing dense peripheral lobar consolidation.  Questionable poor inspiratory effort.     Medications   cefTRIAXone injection 1 g (1 g Intravenous Given 4/20/25 0254)   iohexoL (OMNIPAQUE 350) injection 80 mL (80 mLs Intravenous Given 4/20/25 0343)   ketorolac injection 15 mg (15 mg Intravenous Given 4/20/25 0528)   acetaminophen tablet 1,000 mg (1,000 mg Oral Given 4/20/25 0527)     Medical Decision Making  Differential diagnosis: PE, atypical ACS, pneumothorax, pleural effusion, community-acquired pneumonia, pancreatitis, GERD, cholecystitis, enteritis, colitis, constipation, small-bowel obstruction    Amount and/or Complexity of Data Reviewed  External Data Reviewed: ECG and notes.  Labs: ordered. Decision-making details documented in ED Course.  Radiology: ordered and independent  interpretation performed. Decision-making details documented in ED Course.  ECG/medicine tests: ordered and independent interpretation performed. Decision-making details documented in ED Course.     Details: No SVT or malignant arrhythmia during entirety of our ED stay.  It sounds as if she received adenosine prior to being transferred here.  We will refer to Cardiology for evaluation.  Discussion of management or test interpretation with external provider(s): CTA without PE.  Reassuring HEART score.  Urinalysis with evidence of infection.  Given reported left-sided back pain, urinary symptoms, will treat as pyelonephritis.  No positive urine culture on file.  Given Rocephin in the ED. Will discharge with Levaquin given appropriate renal coverage as well as coverage for small airway disease on CTA, although think this is likely chronic and related to her asthma.  We have discussed interim return precautions and red flags.     Risk  OTC drugs.  Prescription drug management.      Additional MDM:   Heart Score:    History:          Slightly suspicious.  ECG:             Nonspecific repolarisation disturbance  Age:               Less than 45 years  Risk factors: no risk factors known  Troponin:       Less than or equal to normal limit  Heart Score = 1                ED Course as of 04/20/25 0553   Sun Apr 20, 2025   0524 Became nearly febrile just prior to d/c. Didn't receive the fluids from EMS, so will finish 1L NS. Given toradol, tylenol. Will recheck vitals. Does meet sepsis criteria. Given overall well-appearance, no reported fever or chills prior to today's visit, given she remains normotensive with normal mental status, otherwise healthy with no significant comorbidities, I do feel she can be safely discharge and attempt outpatient management. []   0553 qSOFA 0.  []   0553 Pending IV fluids, will recheck vitals, patient comfortable with discharge, comfortable with current plan. [SM]      ED Course User  Index  [SM] Balaji Sharma PA-C                           Clinical Impression:  Final diagnoses:  [R00.2] Palpitations (Primary)  [N12] Pyelonephritis  [R10.12] Left upper quadrant abdominal pain  [I49.9] Cardiac arrhythmia, unspecified cardiac arrhythmia type  [A41.9] Sepsis, due to unspecified organism, unspecified whether acute organ dysfunction present          ED Disposition Condition    Discharge Good          ED Prescriptions       Medication Sig Dispense Start Date End Date Auth. Provider    levoFLOXacin (LEVAQUIN) 750 MG tablet  (Status: Discontinued) Take 1 tablet (750 mg total) by mouth once daily. for 5 days 5 tablet 4/20/2025 4/20/2025 Balaji Sharma PA-C    ketorolac (TORADOL) 10 mg tablet  (Status: Discontinued) Take 1 tablet (10 mg total) by mouth 4 (four) times daily as needed for Pain. 20 tablet 4/20/2025 4/20/2025 Balaji Sharma PA-C    ondansetron (ZOFRAN-ODT) 4 MG TbDL  (Status: Discontinued) Take 1 tablet (4 mg total) by mouth every 6 (six) hours as needed (Nausea). 12 tablet 4/20/2025 4/20/2025 Balaji Sharma PA-C    famotidine (PEPCID) 20 MG tablet  (Status: Discontinued) Take 1 tablet (20 mg total) by mouth 2 (two) times daily. for 10 days 20 tablet 4/20/2025 4/20/2025 Balaji Sharma PA-C    famotidine (PEPCID) 20 MG tablet Take 1 tablet (20 mg total) by mouth 2 (two) times daily. for 10 days 20 tablet 4/20/2025 4/30/2025 Balaji Sharma PA-C    ketorolac (TORADOL) 10 mg tablet Take 1 tablet (10 mg total) by mouth 4 (four) times daily as needed for Pain. 20 tablet 4/20/2025 4/25/2025 Balaji Sharma PA-C    levoFLOXacin (LEVAQUIN) 750 MG tablet  (Status: Discontinued) Take 1 tablet (750 mg total) by mouth once daily. for 5 days 5 tablet 4/20/2025 4/20/2025 Balaji Sharma, MELISA    ondansetron (ZOFRAN-ODT) 4 MG TbDL  (Status: Discontinued) Take 1 tablet (4 mg total) by mouth every 6 (six) hours as needed (Nausea). 12 tablet 4/20/2025 4/20/2025 Zachary  Balaji MORENO PA-C    levoFLOXacin (LEVAQUIN) 750 MG tablet Take 1 tablet (750 mg total) by mouth once daily. for 7 days 7 tablet 4/20/2025 4/27/2025 Balaji Sharma PA-C    ondansetron (ZOFRAN-ODT) 4 MG TbDL Take 1 tablet (4 mg total) by mouth every 6 (six) hours as needed (Nausea). 12 tablet 4/20/2025 -- Balaji Sharma PA-C          Follow-up Information       Follow up With Specialties Details Why Contact Info    Jaime Steen  Schedule an appointment as soon as possible for a visit  For reevaluation [facility] [place] [address]   [phone] (Work)   [phone] (Fax)    Callie Blum MD Cardiology Schedule an appointment as soon as possible for a visit  For reevaluation by Cardiology [address]  [address]  [facility] [address]  [phone]      [facility] Emergency Medicine  As needed, If symptoms worsen [address] [facility] [place] [address] [phone]                 [1]   Social History  Tobacco Use    Smoking status: Never   Substance Use Topics    Alcohol use: No    Drug use: No        Balaji Sharma PA-C  04/20/25 0561

## 2025-04-20 NOTE — ED NOTES
Pt report received from DOROTHEA Mcadams. Pt and family updated on plan of care. No new/additional symptoms. Call light in reach.

## 2025-04-21 ENCOUNTER — RESULTS FOLLOW-UP (OUTPATIENT)
Dept: EMERGENCY MEDICINE | Facility: HOSPITAL | Age: 35
End: 2025-04-21

## 2025-04-22 LAB — BACTERIA UR CULT: ABNORMAL

## 2025-04-25 ENCOUNTER — OFFICE VISIT (OUTPATIENT)
Dept: CARDIOLOGY | Facility: CLINIC | Age: 35
End: 2025-04-25
Payer: COMMERCIAL

## 2025-04-25 VITALS
SYSTOLIC BLOOD PRESSURE: 110 MMHG | HEIGHT: 68 IN | OXYGEN SATURATION: 96 % | WEIGHT: 236.56 LBS | HEART RATE: 84 BPM | BODY MASS INDEX: 35.85 KG/M2 | DIASTOLIC BLOOD PRESSURE: 80 MMHG

## 2025-04-25 DIAGNOSIS — I49.9 CARDIAC ARRHYTHMIA, UNSPECIFIED CARDIAC ARRHYTHMIA TYPE: ICD-10-CM

## 2025-04-25 DIAGNOSIS — R00.2 PALPITATIONS: ICD-10-CM

## 2025-04-25 LAB
OHS QRS DURATION: 94 MS
OHS QTC CALCULATION: 422 MS

## 2025-04-25 PROCEDURE — 99999 PR PBB SHADOW E&M-EST. PATIENT-LVL IV: CPT | Mod: PBBFAC,,, | Performed by: INTERNAL MEDICINE

## 2025-04-25 NOTE — PROGRESS NOTES
Subjective   Patient ID:  Lubna Ty is a 34 y.o. female who presents for evaluation of No chief complaint on file.      HPI    Went to the ER 4/20/25  34-year-old female presents to ED via transfer from [facility] ED complaining of 3 day history of intermittent left upper quadrant abdominal pain.     Intermittent pain, woke her from sleep Thursday evening; again woke her from sleep tonight prompting visit to [facility] ED. she states upon arrival to ED she was experiencing mild fluttering, palpitations.  No associated shortness of breath, chest pain, syncope or near-syncope.  Per their ED staff, she was in SVT and was given adenosine.  She was then transferred to the ED for re-evaluation.  No personal history of ACS.  Denies thyroid issues.  Denies history of AFib or any other arrhythmias.  Denies history of SVT.  No recurrence of palpitations or fluttering sensation since being given medication at the prior ED.     Concerning her abdominal pain: She does admit to occasional nausea related to pain.  No emesis.  She denies any change in appetite or intake.  Pain is sharp, moderate to severe in nature, involves her left-sided thoracic back. No radiation of pain from the back to her abdomen.  No history of nephrolithiasis.  Does admit to increased urinary frequency.  Denies frequent UTIs.  Admits to mild, chronic constipation not acutely worsened.  Does not feel constipated at this time.  No recent diarrhea.  No recent melena or hematochezia.  Denies history of pancreatitis.  No excessive ETOH use. Denies regular or recent NSAID use.  Denies history of GERD.  Denies history of any gallbladder issues.  No overlying skin changes.  Denies any new or worsening cough.  No shortness of breath or chest pain.  States there is chronic wheeze, but denies any worsening of her asthma.  She denies fever, chills, myalgias.  Does admit to worsening of abdominal pain with deep inspiration.  No other exacerbating or  alleviating factors.  No radiation of symptoms.  No exogenous estrogen.  No leg swelling or calf pain.  No history of VTE.     EKG not in EPIC - 4/20/25 per ER note NSR NSSTT changes    4/25/25 Denies CP, mild DEAN that she attributes to her asthma  Denies further heart racing or palpitations since the ER  EKG NSR - ok  Not a smoker    Review of Systems   Constitutional: Negative for decreased appetite.   HENT:  Negative for ear discharge.    Eyes:  Negative for blurred vision.   Respiratory:  Negative for hemoptysis.    Endocrine: Negative for polyphagia.   Hematologic/Lymphatic: Negative for adenopathy.   Skin:  Negative for color change.   Musculoskeletal:  Negative for joint swelling.   Genitourinary:  Negative for bladder incontinence.   Neurological:  Negative for brief paralysis.   Psychiatric/Behavioral:  Negative for hallucinations.    Allergic/Immunologic: Negative for hives.          Objective     Physical Exam  Constitutional:       Appearance: She is well-developed.   HENT:      Head: Normocephalic and atraumatic.   Eyes:      Conjunctiva/sclera: Conjunctivae normal.      Pupils: Pupils are equal, round, and reactive to light.   Cardiovascular:      Rate and Rhythm: Normal rate.      Pulses: Intact distal pulses.      Heart sounds: Normal heart sounds.   Pulmonary:      Effort: Pulmonary effort is normal.      Breath sounds: Normal breath sounds.   Abdominal:      General: Bowel sounds are normal.      Palpations: Abdomen is soft.   Musculoskeletal:         General: Normal range of motion.      Cervical back: Normal range of motion and neck supple.   Skin:     General: Skin is warm and dry.   Neurological:      Mental Status: She is alert and oriented to person, place, and time.            Assessment and Plan     1. Palpitations    2. Cardiac arrhythmia, unspecified cardiac arrhythmia type        Plan:     Recent ER visit with ? SVT. Denies further palpitations or heart racing  EKG normal  Would observe  for now - if palpitations return consider event monitor  OV 6 months    Advance Care Planning     Date: 04/25/2025  Patient did not wish or was not able to name a surrogate decision maker or provide an Advance Care Plan.

## 2025-05-08 ENCOUNTER — PATIENT MESSAGE (OUTPATIENT)
Dept: CARDIOLOGY | Facility: CLINIC | Age: 35
End: 2025-05-08
Payer: COMMERCIAL

## 2025-05-08 DIAGNOSIS — R00.2 PALPITATIONS: Primary | ICD-10-CM

## 2025-05-08 DIAGNOSIS — I49.9 CARDIAC ARRHYTHMIA, UNSPECIFIED CARDIAC ARRHYTHMIA TYPE: ICD-10-CM

## 2025-05-09 ENCOUNTER — CLINICAL SUPPORT (OUTPATIENT)
Dept: CARDIOLOGY | Facility: HOSPITAL | Age: 35
End: 2025-05-09
Attending: INTERNAL MEDICINE
Payer: COMMERCIAL

## 2025-05-09 DIAGNOSIS — I49.9 CARDIAC ARRHYTHMIA, UNSPECIFIED CARDIAC ARRHYTHMIA TYPE: ICD-10-CM

## 2025-05-09 DIAGNOSIS — R00.2 PALPITATIONS: ICD-10-CM
